# Patient Record
Sex: MALE | HISPANIC OR LATINO | URBAN - METROPOLITAN AREA
[De-identification: names, ages, dates, MRNs, and addresses within clinical notes are randomized per-mention and may not be internally consistent; named-entity substitution may affect disease eponyms.]

---

## 2020-08-15 ENCOUNTER — INPATIENT (INPATIENT)
Facility: HOSPITAL | Age: 25
LOS: 6 days | Discharge: HOME | DRG: 912 | End: 2020-08-22
Attending: ORTHOPAEDIC SURGERY | Admitting: GENERAL PRACTICE
Payer: MEDICAID

## 2020-08-15 VITALS
HEART RATE: 78 BPM | DIASTOLIC BLOOD PRESSURE: 75 MMHG | SYSTOLIC BLOOD PRESSURE: 137 MMHG | OXYGEN SATURATION: 100 % | TEMPERATURE: 100 F | RESPIRATION RATE: 18 BRPM | WEIGHT: 184.09 LBS

## 2020-08-15 DIAGNOSIS — S72.91XA UNSPECIFIED FRACTURE OF RIGHT FEMUR, INITIAL ENCOUNTER FOR CLOSED FRACTURE: ICD-10-CM

## 2020-08-15 LAB
ALBUMIN SERPL ELPH-MCNC: 3.9 G/DL — SIGNIFICANT CHANGE UP (ref 3.3–5)
ALP SERPL-CCNC: 98 U/L — SIGNIFICANT CHANGE UP (ref 40–120)
ALT FLD-CCNC: 108 U/L — HIGH (ref 12–78)
ANION GAP SERPL CALC-SCNC: 8 MMOL/L — SIGNIFICANT CHANGE UP (ref 5–17)
APTT BLD: 24.3 SEC — LOW (ref 27.5–35.5)
AST SERPL-CCNC: 117 U/L — HIGH (ref 15–37)
BASOPHILS # BLD AUTO: 0.04 K/UL — SIGNIFICANT CHANGE UP (ref 0–0.2)
BASOPHILS NFR BLD AUTO: 0.3 % — SIGNIFICANT CHANGE UP (ref 0–2)
BILIRUB SERPL-MCNC: 0.4 MG/DL — SIGNIFICANT CHANGE UP (ref 0.2–1.2)
BUN SERPL-MCNC: 21 MG/DL — SIGNIFICANT CHANGE UP (ref 7–23)
CALCIUM SERPL-MCNC: 8.2 MG/DL — LOW (ref 8.5–10.1)
CHLORIDE SERPL-SCNC: 110 MMOL/L — HIGH (ref 96–108)
CK SERPL-CCNC: 248 U/L — SIGNIFICANT CHANGE UP (ref 26–308)
CO2 SERPL-SCNC: 25 MMOL/L — SIGNIFICANT CHANGE UP (ref 22–31)
CREAT SERPL-MCNC: 1.15 MG/DL — SIGNIFICANT CHANGE UP (ref 0.5–1.3)
EOSINOPHIL # BLD AUTO: 0.05 K/UL — SIGNIFICANT CHANGE UP (ref 0–0.5)
EOSINOPHIL NFR BLD AUTO: 0.4 % — SIGNIFICANT CHANGE UP (ref 0–6)
ETHANOL SERPL-MCNC: <10 MG/DL — SIGNIFICANT CHANGE UP (ref 0–10)
GLUCOSE SERPL-MCNC: 128 MG/DL — HIGH (ref 70–99)
HCT VFR BLD CALC: 44.3 % — SIGNIFICANT CHANGE UP (ref 39–50)
HGB BLD-MCNC: 14.8 G/DL — SIGNIFICANT CHANGE UP (ref 13–17)
IMM GRANULOCYTES NFR BLD AUTO: 0.5 % — SIGNIFICANT CHANGE UP (ref 0–1.5)
INR BLD: 1.05 RATIO — SIGNIFICANT CHANGE UP (ref 0.88–1.16)
LACTATE SERPL-SCNC: 1.8 MMOL/L — SIGNIFICANT CHANGE UP (ref 0.7–2)
LIDOCAIN IGE QN: 158 U/L — SIGNIFICANT CHANGE UP (ref 73–393)
LYMPHOCYTES # BLD AUTO: 29.3 % — SIGNIFICANT CHANGE UP (ref 13–44)
LYMPHOCYTES # BLD AUTO: 3.41 K/UL — HIGH (ref 1–3.3)
MCHC RBC-ENTMCNC: 29.1 PG — SIGNIFICANT CHANGE UP (ref 27–34)
MCHC RBC-ENTMCNC: 33.4 GM/DL — SIGNIFICANT CHANGE UP (ref 32–36)
MCV RBC AUTO: 87.2 FL — SIGNIFICANT CHANGE UP (ref 80–100)
MONOCYTES # BLD AUTO: 0.84 K/UL — SIGNIFICANT CHANGE UP (ref 0–0.9)
MONOCYTES NFR BLD AUTO: 7.2 % — SIGNIFICANT CHANGE UP (ref 2–14)
NEUTROPHILS # BLD AUTO: 7.25 K/UL — SIGNIFICANT CHANGE UP (ref 1.8–7.4)
NEUTROPHILS NFR BLD AUTO: 62.3 % — SIGNIFICANT CHANGE UP (ref 43–77)
PLATELET # BLD AUTO: 191 K/UL — SIGNIFICANT CHANGE UP (ref 150–400)
POTASSIUM SERPL-MCNC: 3.3 MMOL/L — LOW (ref 3.5–5.3)
POTASSIUM SERPL-SCNC: 3.3 MMOL/L — LOW (ref 3.5–5.3)
PROT SERPL-MCNC: 7.1 GM/DL — SIGNIFICANT CHANGE UP (ref 6–8.3)
PROTHROM AB SERPL-ACNC: 12.3 SEC — SIGNIFICANT CHANGE UP (ref 10.6–13.6)
RBC # BLD: 5.08 M/UL — SIGNIFICANT CHANGE UP (ref 4.2–5.8)
RBC # FLD: 12.8 % — SIGNIFICANT CHANGE UP (ref 10.3–14.5)
SARS-COV-2 RNA SPEC QL NAA+PROBE: DETECTED
SODIUM SERPL-SCNC: 143 MMOL/L — SIGNIFICANT CHANGE UP (ref 135–145)
TROPONIN I SERPL-MCNC: <0.015 NG/ML — SIGNIFICANT CHANGE UP (ref 0.01–0.04)
WBC # BLD: 11.65 K/UL — HIGH (ref 3.8–10.5)
WBC # FLD AUTO: 11.65 K/UL — HIGH (ref 3.8–10.5)

## 2020-08-15 PROCEDURE — 73564 X-RAY EXAM KNEE 4 OR MORE: CPT | Mod: RT

## 2020-08-15 PROCEDURE — C1889: CPT

## 2020-08-15 PROCEDURE — 73502 X-RAY EXAM HIP UNI 2-3 VIEWS: CPT | Mod: RT

## 2020-08-15 PROCEDURE — 73502 X-RAY EXAM HIP UNI 2-3 VIEWS: CPT | Mod: 26,RT

## 2020-08-15 PROCEDURE — 70450 CT HEAD/BRAIN W/O DYE: CPT

## 2020-08-15 PROCEDURE — 85610 PROTHROMBIN TIME: CPT

## 2020-08-15 PROCEDURE — 73551 X-RAY EXAM OF FEMUR 1: CPT | Mod: RT

## 2020-08-15 PROCEDURE — 73090 X-RAY EXAM OF FOREARM: CPT | Mod: 26,LT

## 2020-08-15 PROCEDURE — 85027 COMPLETE CBC AUTOMATED: CPT

## 2020-08-15 PROCEDURE — 80076 HEPATIC FUNCTION PANEL: CPT

## 2020-08-15 PROCEDURE — P9016: CPT

## 2020-08-15 PROCEDURE — 84100 ASSAY OF PHOSPHORUS: CPT

## 2020-08-15 PROCEDURE — 73552 X-RAY EXAM OF FEMUR 2/>: CPT | Mod: RT

## 2020-08-15 PROCEDURE — 97162 PT EVAL MOD COMPLEX 30 MIN: CPT | Mod: GP

## 2020-08-15 PROCEDURE — 73080 X-RAY EXAM OF ELBOW: CPT | Mod: LT

## 2020-08-15 PROCEDURE — 73590 X-RAY EXAM OF LOWER LEG: CPT | Mod: RT

## 2020-08-15 PROCEDURE — 76000 FLUOROSCOPY <1 HR PHYS/QHP: CPT

## 2020-08-15 PROCEDURE — 73080 X-RAY EXAM OF ELBOW: CPT | Mod: 26,LT

## 2020-08-15 PROCEDURE — 36430 TRANSFUSION BLD/BLD COMPNT: CPT

## 2020-08-15 PROCEDURE — 71260 CT THORAX DX C+: CPT

## 2020-08-15 PROCEDURE — 72125 CT NECK SPINE W/O DYE: CPT | Mod: 26

## 2020-08-15 PROCEDURE — C1769: CPT

## 2020-08-15 PROCEDURE — 73060 X-RAY EXAM OF HUMERUS: CPT | Mod: 26,LT

## 2020-08-15 PROCEDURE — C1713: CPT

## 2020-08-15 PROCEDURE — 73564 X-RAY EXAM KNEE 4 OR MORE: CPT | Mod: 26,RT

## 2020-08-15 PROCEDURE — 97116 GAIT TRAINING THERAPY: CPT | Mod: GP

## 2020-08-15 PROCEDURE — 72125 CT NECK SPINE W/O DYE: CPT

## 2020-08-15 PROCEDURE — 73060 X-RAY EXAM OF HUMERUS: CPT | Mod: LT

## 2020-08-15 PROCEDURE — 85730 THROMBOPLASTIN TIME PARTIAL: CPT

## 2020-08-15 PROCEDURE — 73090 X-RAY EXAM OF FOREARM: CPT | Mod: LT

## 2020-08-15 PROCEDURE — 86769 SARS-COV-2 COVID-19 ANTIBODY: CPT

## 2020-08-15 PROCEDURE — 36415 COLL VENOUS BLD VENIPUNCTURE: CPT

## 2020-08-15 PROCEDURE — 81001 URINALYSIS AUTO W/SCOPE: CPT

## 2020-08-15 PROCEDURE — U0003: CPT

## 2020-08-15 PROCEDURE — 87635 SARS-COV-2 COVID-19 AMP PRB: CPT

## 2020-08-15 PROCEDURE — 73130 X-RAY EXAM OF HAND: CPT | Mod: RT

## 2020-08-15 PROCEDURE — 73130 X-RAY EXAM OF HAND: CPT | Mod: 26,RT

## 2020-08-15 PROCEDURE — 93005 ELECTROCARDIOGRAM TRACING: CPT

## 2020-08-15 PROCEDURE — 75635 CT ANGIO ABDOMINAL ARTERIES: CPT

## 2020-08-15 PROCEDURE — 80048 BASIC METABOLIC PNL TOTAL CA: CPT

## 2020-08-15 PROCEDURE — 86920 COMPATIBILITY TEST SPIN: CPT

## 2020-08-15 PROCEDURE — 97530 THERAPEUTIC ACTIVITIES: CPT | Mod: GP

## 2020-08-15 PROCEDURE — 70450 CT HEAD/BRAIN W/O DYE: CPT | Mod: 26

## 2020-08-15 PROCEDURE — 83735 ASSAY OF MAGNESIUM: CPT

## 2020-08-15 PROCEDURE — 73552 X-RAY EXAM OF FEMUR 2/>: CPT | Mod: 26,RT

## 2020-08-15 PROCEDURE — 73590 X-RAY EXAM OF LOWER LEG: CPT | Mod: 26,RT

## 2020-08-15 PROCEDURE — 71260 CT THORAX DX C+: CPT | Mod: 26

## 2020-08-15 PROCEDURE — 83605 ASSAY OF LACTIC ACID: CPT

## 2020-08-15 PROCEDURE — 75635 CT ANGIO ABDOMINAL ARTERIES: CPT | Mod: 26

## 2020-08-15 RX ORDER — SENNA PLUS 8.6 MG/1
2 TABLET ORAL AT BEDTIME
Refills: 0 | Status: DISCONTINUED | OUTPATIENT
Start: 2020-08-15 | End: 2020-08-16

## 2020-08-15 RX ORDER — BACITRACIN ZINC 500 UNIT/G
1 OINTMENT IN PACKET (EA) TOPICAL
Refills: 0 | Status: DISCONTINUED | OUTPATIENT
Start: 2020-08-15 | End: 2020-08-16

## 2020-08-15 RX ORDER — FENTANYL CITRATE 50 UG/ML
25 INJECTION INTRAVENOUS
Refills: 0 | Status: DISCONTINUED | OUTPATIENT
Start: 2020-08-15 | End: 2020-08-16

## 2020-08-15 RX ORDER — HYDROMORPHONE HYDROCHLORIDE 2 MG/ML
0.5 INJECTION INTRAMUSCULAR; INTRAVENOUS; SUBCUTANEOUS ONCE
Refills: 0 | Status: DISCONTINUED | OUTPATIENT
Start: 2020-08-15 | End: 2020-08-15

## 2020-08-15 RX ORDER — ACETAMINOPHEN 500 MG
975 TABLET ORAL EVERY 8 HOURS
Refills: 0 | Status: DISCONTINUED | OUTPATIENT
Start: 2020-08-15 | End: 2020-08-16

## 2020-08-15 RX ORDER — HYDROMORPHONE HYDROCHLORIDE 2 MG/ML
0.5 INJECTION INTRAMUSCULAR; INTRAVENOUS; SUBCUTANEOUS
Refills: 0 | Status: DISCONTINUED | OUTPATIENT
Start: 2020-08-15 | End: 2020-08-16

## 2020-08-15 RX ORDER — ENOXAPARIN SODIUM 100 MG/ML
30 INJECTION SUBCUTANEOUS EVERY 12 HOURS
Refills: 0 | Status: DISCONTINUED | OUTPATIENT
Start: 2020-08-15 | End: 2020-08-15

## 2020-08-15 RX ORDER — CEFAZOLIN SODIUM 1 G
2000 VIAL (EA) INJECTION ONCE
Refills: 0 | Status: COMPLETED | OUTPATIENT
Start: 2020-08-15 | End: 2020-08-15

## 2020-08-15 RX ORDER — OXYCODONE HYDROCHLORIDE 5 MG/1
10 TABLET ORAL ONCE
Refills: 0 | Status: DISCONTINUED | OUTPATIENT
Start: 2020-08-15 | End: 2020-08-16

## 2020-08-15 RX ORDER — SODIUM CHLORIDE 9 MG/ML
1000 INJECTION, SOLUTION INTRAVENOUS
Refills: 0 | Status: DISCONTINUED | OUTPATIENT
Start: 2020-08-15 | End: 2020-08-15

## 2020-08-15 RX ORDER — SODIUM CHLORIDE 9 MG/ML
1000 INJECTION INTRAMUSCULAR; INTRAVENOUS; SUBCUTANEOUS ONCE
Refills: 0 | Status: COMPLETED | OUTPATIENT
Start: 2020-08-15 | End: 2020-08-15

## 2020-08-15 RX ORDER — SODIUM CHLORIDE 9 MG/ML
1000 INJECTION, SOLUTION INTRAVENOUS
Refills: 0 | Status: DISCONTINUED | OUTPATIENT
Start: 2020-08-15 | End: 2020-08-16

## 2020-08-15 RX ORDER — OXYCODONE HYDROCHLORIDE 5 MG/1
5 TABLET ORAL EVERY 4 HOURS
Refills: 0 | Status: DISCONTINUED | OUTPATIENT
Start: 2020-08-15 | End: 2020-08-16

## 2020-08-15 RX ORDER — OXYCODONE HYDROCHLORIDE 5 MG/1
10 TABLET ORAL EVERY 4 HOURS
Refills: 0 | Status: DISCONTINUED | OUTPATIENT
Start: 2020-08-15 | End: 2020-08-16

## 2020-08-15 RX ORDER — FENTANYL CITRATE 50 UG/ML
50 INJECTION INTRAVENOUS ONCE
Refills: 0 | Status: DISCONTINUED | OUTPATIENT
Start: 2020-08-15 | End: 2020-08-15

## 2020-08-15 RX ORDER — ONDANSETRON 8 MG/1
4 TABLET, FILM COATED ORAL EVERY 6 HOURS
Refills: 0 | Status: DISCONTINUED | OUTPATIENT
Start: 2020-08-15 | End: 2020-08-16

## 2020-08-15 RX ADMIN — SODIUM CHLORIDE 1000 MILLILITER(S): 9 INJECTION INTRAMUSCULAR; INTRAVENOUS; SUBCUTANEOUS at 21:44

## 2020-08-15 RX ADMIN — SODIUM CHLORIDE 125 MILLILITER(S): 9 INJECTION, SOLUTION INTRAVENOUS at 23:43

## 2020-08-15 RX ADMIN — FENTANYL CITRATE 50 MICROGRAM(S): 50 INJECTION INTRAVENOUS at 20:40

## 2020-08-15 RX ADMIN — SODIUM CHLORIDE 1000 MILLILITER(S): 9 INJECTION INTRAMUSCULAR; INTRAVENOUS; SUBCUTANEOUS at 20:45

## 2020-08-15 RX ADMIN — SODIUM CHLORIDE 1000 MILLILITER(S): 9 INJECTION INTRAMUSCULAR; INTRAVENOUS; SUBCUTANEOUS at 21:45

## 2020-08-15 RX ADMIN — FENTANYL CITRATE 50 MICROGRAM(S): 50 INJECTION INTRAVENOUS at 20:45

## 2020-08-15 RX ADMIN — Medication 100 MILLIGRAM(S): at 20:44

## 2020-08-15 RX ADMIN — HYDROMORPHONE HYDROCHLORIDE 0.5 MILLIGRAM(S): 2 INJECTION INTRAMUSCULAR; INTRAVENOUS; SUBCUTANEOUS at 21:02

## 2020-08-15 RX ADMIN — HYDROMORPHONE HYDROCHLORIDE 0.5 MILLIGRAM(S): 2 INJECTION INTRAMUSCULAR; INTRAVENOUS; SUBCUTANEOUS at 20:56

## 2020-08-15 RX ADMIN — SODIUM CHLORIDE 1000 MILLILITER(S): 9 INJECTION INTRAMUSCULAR; INTRAVENOUS; SUBCUTANEOUS at 20:44

## 2020-08-15 RX ADMIN — Medication 2000 MILLIGRAM(S): at 21:14
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
## 2020-08-15 NOTE — ED PROVIDER NOTE - CLINICAL SUMMARY MEDICAL DECISION MAKING FREE TEXT BOX
Upgraded to code trauma, will waive labs for CT, concerns for femur fracture with hematoma. Upgraded to code trauma, will waive labs for CT, concerns for femur fracture with hematoma.  final dispo: Right adrenal hematoma without active extravasation. Displaced R mid femoral shaft with  No evidence for vascular injury. Anterior right knee laceration with foreign bodies in the superficial soft tissue

## 2020-08-15 NOTE — H&P ADULT - NSHPLABSRESULTS_GEN_ALL_CORE
Vital Signs Last 24 Hrs  T(C): 37.7 (15 Aug 2020 20:03), Max: 37.7 (15 Aug 2020 20:03)  T(F): 99.9 (15 Aug 2020 20:03), Max: 99.9 (15 Aug 2020 20:03)  HR: 78 (15 Aug 2020 20:03) (78 - 78)  BP: 137/75 (15 Aug 2020 20:03) (137/75 - 137/75)  BP(mean): --  RR: 18 (15 Aug 2020 20:03) (18 - 18)  SpO2: 100% (15 Aug 2020 20:03) (100% - 100%)                          14.8   11.65 )-----------( 191      ( 15 Aug 2020 20:08 )             44.3       08-15    143  |  110<H>  |  21  ----------------------------<  128<H>  3.3<L>   |  25  |  1.15    Ca    8.2<L>      15 Aug 2020 20:08    TPro  7.1  /  Alb  3.9  /  TBili  0.4  /  DBili  x   /  AST  117<H>  /  ALT  108<H>  /  AlkPhos  98  08-15      LIVER FUNCTIONS - ( 15 Aug 2020 20:08 )  Alb: 3.9 g/dL / Pro: 7.1 gm/dL / ALK PHOS: 98 U/L / ALT: 108 U/L / AST: 117 U/L / GGT: x             MEDICATIONS  (STANDING):    MEDICATIONS  (PRN):      MEDICATIONS  (STANDING):    < from: CT Cervical Spine No Cont (08.15.20 @ 20:43) >      IMPRESSION: No intracranial hemorrhage or depressed calvarial fracture.    CT cervical spine:    COMPARISON: None    TECHNIQUE: Axial CT images were acquired from base of the skull to the thoracic inlet without the use of intravenous contrast. Coronal and sagittal reconstructions are provided.    FINDINGS: Cervical lordosis is maintained. Vertebral body heights and intervertebral disc spaces are maintained. Normal spinal alignment is seen without evidence of dislocation. No acute fracture is seen. No prevertebral hematoma is seen. The regional soft tissues are grossly unremarkable.    The visualized lung apices are clear.      IMPRESSION: No acute fracture or dislocation of the cervical spine.    < end of copied text > Vital Signs Last 24 Hrs  T(C): 37.7 (15 Aug 2020 20:03), Max: 37.7 (15 Aug 2020 20:03)  T(F): 99.9 (15 Aug 2020 20:03), Max: 99.9 (15 Aug 2020 20:03)  HR: 78 (15 Aug 2020 20:03) (78 - 78)  BP: 137/75 (15 Aug 2020 20:03) (137/75 - 137/75)  BP(mean): --  RR: 18 (15 Aug 2020 20:03) (18 - 18)  SpO2: 100% (15 Aug 2020 20:03) (100% - 100%)                          14.8   11.65 )-----------( 191      ( 15 Aug 2020 20:08 )             44.3       08-15    143  |  110<H>  |  21  ----------------------------<  128<H>  3.3<L>   |  25  |  1.15    Ca    8.2<L>      15 Aug 2020 20:08    TPro  7.1  /  Alb  3.9  /  TBili  0.4  /  DBili  x   /  AST  117<H>  /  ALT  108<H>  /  AlkPhos  98  08-15      LIVER FUNCTIONS - ( 15 Aug 2020 20:08 )  Alb: 3.9 g/dL / Pro: 7.1 gm/dL / ALK PHOS: 98 U/L / ALT: 108 U/L / AST: 117 U/L / GGT: x             MEDICATIONS  (STANDING):    MEDICATIONS  (PRN):      MEDICATIONS  (STANDING):    < from: CT Cervical Spine No Cont (08.15.20 @ 20:43) >      IMPRESSION: No intracranial hemorrhage or depressed calvarial fracture.    CT cervical spine:    COMPARISON: None    TECHNIQUE: Axial CT images were acquired from base of the skull to the thoracic inlet without the use of intravenous contrast. Coronal and sagittal reconstructions are provided.    FINDINGS: Cervical lordosis is maintained. Vertebral body heights and intervertebral disc spaces are maintained. Normal spinal alignment is seen without evidence of dislocation. No acute fracture is seen. No prevertebral hematoma is seen. The regional soft tissues are grossly unremarkable.  < from: CT Angio Abd Aorta w/run-off w/ IV Cont (08.15.20 @ 21:06) >      IMPRESSION:  Right adrenal hematoma. No evidence for active extravasation.    Displaced fracture through the right proximal to mid femoral shaft with overriding of the fracture fragments. No evidence for vascular injury. Patent three-vessel runoff.    Anterior right knee laceration with foreign bodies in the superficial soft tissues.    < end of copied text >    < from: CT Angio Abd Aorta w/run-off w/ IV Cont (08.15.20 @ 21:06) >    FINDINGS:  CHEST:  LUNGS AND LARGE AIRWAYS: Patentcentral airways. 3 mm right lower lobe pulmonary nodule.  PLEURA: No pleural effusion.  VESSELS: Within normal limits.  HEART: Heart size is normal. No pericardial effusion.  MEDIASTINUM AND NOAH: No lymphadenopathy.  CHEST WALL AND LOWER NECK: Within normal limits.    < end of copied text >      The visualized lung apices are clear.      IMPRESSION: No acute fracture or dislocation of the cervical spine.    < end of copied text >

## 2020-08-15 NOTE — ED PROVIDER NOTE - CARE PLAN
Principal Discharge DX:	Femur fracture, right Principal Discharge DX:	Femur fracture, right  Secondary Diagnosis:	Laceration of knee  Secondary Diagnosis:	Adrenal hematoma, initial encounter

## 2020-08-15 NOTE — ED PROVIDER NOTE - NS_EDPROVIDERDISPOUSERTYPE_ED_A_ED
lov- 12/15    tsh- 12/15    Labs done today. Patient has ov on 1/20    Spoke with patient he will have med refilled at ov when  results are back.   Scribe Attestation (For Scribes USE Only)... Attending Attestation (For Attendings USE Only).../Scribe Attestation (For Scribes USE Only)...
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
## 2020-08-15 NOTE — H&P ADULT - NSHPREVIEWOFSYSTEMS_GEN_ALL_CORE
REVIEW OF SYSTEMS:    CONSTITUTIONAL: No weakness, fevers or chills  EYES/ENT: No visual changes;  No vertigo or throat pain   NECK: No pain or stiffness  RESPIRATORY: No cough, wheezing, hemoptysis; No shortness of breath  CARDIOVASCULAR: No chest pain or palpitations  GASTROINTESTINAL: No abdominal or epigastric pain. No nausea, vomiting, or hematemesis; No diarrhea or constipation. No melena or hematochezia.  GENITOURINARY: No dysuria, frequency or hematuria  NEUROLOGICAL: No numbness or weakness  SKIN: No itching, burning, rashes, or lesions   pain LT upper,RT lower extremity  All other review of systems is negative unless indicated above.
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
## 2020-08-15 NOTE — ED ADULT NURSE NOTE - CAS EDN DISCHARGE ASSESSMENT
Alert and oriented to person, place and time/Symptoms improved/Awake/Neuro vascular intact post splinting
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
## 2020-08-16 ENCOUNTER — TRANSCRIPTION ENCOUNTER (OUTPATIENT)
Age: 25
End: 2020-08-16

## 2020-08-16 LAB
ALBUMIN SERPL ELPH-MCNC: 3.2 G/DL — LOW (ref 3.3–5)
ALP SERPL-CCNC: 62 U/L — SIGNIFICANT CHANGE UP (ref 40–120)
ALT FLD-CCNC: 88 U/L — HIGH (ref 12–78)
ANION GAP SERPL CALC-SCNC: 6 MMOL/L — SIGNIFICANT CHANGE UP (ref 5–17)
ANION GAP SERPL CALC-SCNC: 6 MMOL/L — SIGNIFICANT CHANGE UP (ref 5–17)
APPEARANCE UR: CLEAR — SIGNIFICANT CHANGE UP
APTT BLD: 25.2 SEC — LOW (ref 27.5–35.5)
AST SERPL-CCNC: 109 U/L — HIGH (ref 15–37)
BILIRUB DIRECT SERPL-MCNC: 0.2 MG/DL — SIGNIFICANT CHANGE UP (ref 0–0.2)
BILIRUB INDIRECT FLD-MCNC: 0.5 MG/DL — SIGNIFICANT CHANGE UP (ref 0.2–1)
BILIRUB SERPL-MCNC: 0.7 MG/DL — SIGNIFICANT CHANGE UP (ref 0.2–1.2)
BILIRUB UR-MCNC: NEGATIVE — SIGNIFICANT CHANGE UP
BUN SERPL-MCNC: 16 MG/DL — SIGNIFICANT CHANGE UP (ref 7–23)
BUN SERPL-MCNC: 17 MG/DL — SIGNIFICANT CHANGE UP (ref 7–23)
CALCIUM SERPL-MCNC: 7.6 MG/DL — LOW (ref 8.5–10.1)
CALCIUM SERPL-MCNC: 7.7 MG/DL — LOW (ref 8.5–10.1)
CHLORIDE SERPL-SCNC: 109 MMOL/L — HIGH (ref 96–108)
CHLORIDE SERPL-SCNC: 110 MMOL/L — HIGH (ref 96–108)
CO2 SERPL-SCNC: 25 MMOL/L — SIGNIFICANT CHANGE UP (ref 22–31)
CO2 SERPL-SCNC: 25 MMOL/L — SIGNIFICANT CHANGE UP (ref 22–31)
COLOR SPEC: YELLOW — SIGNIFICANT CHANGE UP
CREAT SERPL-MCNC: 0.94 MG/DL — SIGNIFICANT CHANGE UP (ref 0.5–1.3)
CREAT SERPL-MCNC: 0.97 MG/DL — SIGNIFICANT CHANGE UP (ref 0.5–1.3)
DIFF PNL FLD: ABNORMAL
GLUCOSE SERPL-MCNC: 154 MG/DL — HIGH (ref 70–99)
GLUCOSE SERPL-MCNC: 159 MG/DL — HIGH (ref 70–99)
GLUCOSE UR QL: NEGATIVE MG/DL — SIGNIFICANT CHANGE UP
HCT VFR BLD CALC: 33.8 % — LOW (ref 39–50)
HCT VFR BLD CALC: 35.3 % — LOW (ref 39–50)
HGB BLD-MCNC: 11.5 G/DL — LOW (ref 13–17)
HGB BLD-MCNC: 12 G/DL — LOW (ref 13–17)
INR BLD: 1.2 RATIO — HIGH (ref 0.88–1.16)
KETONES UR-MCNC: ABNORMAL
LEUKOCYTE ESTERASE UR-ACNC: NEGATIVE — SIGNIFICANT CHANGE UP
MAGNESIUM SERPL-MCNC: 1.5 MG/DL — LOW (ref 1.6–2.6)
MCHC RBC-ENTMCNC: 29.1 PG — SIGNIFICANT CHANGE UP (ref 27–34)
MCHC RBC-ENTMCNC: 29.6 PG — SIGNIFICANT CHANGE UP (ref 27–34)
MCHC RBC-ENTMCNC: 34 GM/DL — SIGNIFICANT CHANGE UP (ref 32–36)
MCHC RBC-ENTMCNC: 34 GM/DL — SIGNIFICANT CHANGE UP (ref 32–36)
MCV RBC AUTO: 85.7 FL — SIGNIFICANT CHANGE UP (ref 80–100)
MCV RBC AUTO: 86.9 FL — SIGNIFICANT CHANGE UP (ref 80–100)
NITRITE UR-MCNC: NEGATIVE — SIGNIFICANT CHANGE UP
PH UR: 6.5 — SIGNIFICANT CHANGE UP (ref 5–8)
PHOSPHATE SERPL-MCNC: 3.8 MG/DL — SIGNIFICANT CHANGE UP (ref 2.5–4.5)
PLATELET # BLD AUTO: 127 K/UL — LOW (ref 150–400)
PLATELET # BLD AUTO: 139 K/UL — LOW (ref 150–400)
POTASSIUM SERPL-MCNC: 4 MMOL/L — SIGNIFICANT CHANGE UP (ref 3.5–5.3)
POTASSIUM SERPL-MCNC: 4.5 MMOL/L — SIGNIFICANT CHANGE UP (ref 3.5–5.3)
POTASSIUM SERPL-SCNC: 4 MMOL/L — SIGNIFICANT CHANGE UP (ref 3.5–5.3)
POTASSIUM SERPL-SCNC: 4.5 MMOL/L — SIGNIFICANT CHANGE UP (ref 3.5–5.3)
PROT SERPL-MCNC: 6 GM/DL — SIGNIFICANT CHANGE UP (ref 6–8.3)
PROT UR-MCNC: 30 MG/DL
PROTHROM AB SERPL-ACNC: 13.8 SEC — HIGH (ref 10.6–13.6)
RBC # BLD: 3.89 M/UL — LOW (ref 4.2–5.8)
RBC # BLD: 4.12 M/UL — LOW (ref 4.2–5.8)
RBC # FLD: 13 % — SIGNIFICANT CHANGE UP (ref 10.3–14.5)
RBC # FLD: 13.1 % — SIGNIFICANT CHANGE UP (ref 10.3–14.5)
SARS-COV-2 IGG SERPL QL IA: POSITIVE
SARS-COV-2 IGM SERPL IA-ACNC: 75.4 INDEX — HIGH
SODIUM SERPL-SCNC: 140 MMOL/L — SIGNIFICANT CHANGE UP (ref 135–145)
SODIUM SERPL-SCNC: 141 MMOL/L — SIGNIFICANT CHANGE UP (ref 135–145)
SP GR SPEC: 1.01 — SIGNIFICANT CHANGE UP (ref 1.01–1.02)
UROBILINOGEN FLD QL: 1 MG/DL
WBC # BLD: 6.36 K/UL — SIGNIFICANT CHANGE UP (ref 3.8–10.5)
WBC # BLD: 8.22 K/UL — SIGNIFICANT CHANGE UP (ref 3.8–10.5)
WBC # FLD AUTO: 6.36 K/UL — SIGNIFICANT CHANGE UP (ref 3.8–10.5)
WBC # FLD AUTO: 8.22 K/UL — SIGNIFICANT CHANGE UP (ref 3.8–10.5)

## 2020-08-16 PROCEDURE — 93010 ELECTROCARDIOGRAM REPORT: CPT

## 2020-08-16 PROCEDURE — 73551 X-RAY EXAM OF FEMUR 1: CPT | Mod: 26,RT

## 2020-08-16 RX ORDER — CEFAZOLIN SODIUM 1 G
2000 VIAL (EA) INJECTION EVERY 8 HOURS
Refills: 0 | Status: DISCONTINUED | OUTPATIENT
Start: 2020-08-16 | End: 2020-08-16

## 2020-08-16 RX ORDER — SODIUM CHLORIDE 9 MG/ML
1000 INJECTION, SOLUTION INTRAVENOUS
Refills: 0 | Status: DISCONTINUED | OUTPATIENT
Start: 2020-08-16 | End: 2020-08-16

## 2020-08-16 RX ORDER — ONDANSETRON 8 MG/1
4 TABLET, FILM COATED ORAL EVERY 6 HOURS
Refills: 0 | Status: DISCONTINUED | OUTPATIENT
Start: 2020-08-16 | End: 2020-08-22

## 2020-08-16 RX ORDER — HYDROMORPHONE HYDROCHLORIDE 2 MG/ML
0.5 INJECTION INTRAMUSCULAR; INTRAVENOUS; SUBCUTANEOUS
Refills: 0 | Status: DISCONTINUED | OUTPATIENT
Start: 2020-08-16 | End: 2020-08-22

## 2020-08-16 RX ORDER — ASCORBIC ACID 60 MG
500 TABLET,CHEWABLE ORAL
Refills: 0 | Status: DISCONTINUED | OUTPATIENT
Start: 2020-08-16 | End: 2020-08-22

## 2020-08-16 RX ORDER — ONDANSETRON 8 MG/1
4 TABLET, FILM COATED ORAL ONCE
Refills: 0 | Status: DISCONTINUED | OUTPATIENT
Start: 2020-08-16 | End: 2020-08-16

## 2020-08-16 RX ORDER — SODIUM CHLORIDE 9 MG/ML
1000 INJECTION, SOLUTION INTRAVENOUS
Refills: 0 | Status: DISCONTINUED | OUTPATIENT
Start: 2020-08-16 | End: 2020-08-17

## 2020-08-16 RX ORDER — ACETAMINOPHEN 500 MG
975 TABLET ORAL EVERY 8 HOURS
Refills: 0 | Status: DISCONTINUED | OUTPATIENT
Start: 2020-08-16 | End: 2020-08-22

## 2020-08-16 RX ORDER — ONDANSETRON 8 MG/1
1 TABLET, FILM COATED ORAL
Qty: 10 | Refills: 0
Start: 2020-08-16 | End: 2020-08-20

## 2020-08-16 RX ORDER — DOCUSATE SODIUM 100 MG
1 CAPSULE ORAL
Qty: 7 | Refills: 0
Start: 2020-08-16 | End: 2020-08-22

## 2020-08-16 RX ORDER — HYDROMORPHONE HYDROCHLORIDE 2 MG/ML
0.5 INJECTION INTRAMUSCULAR; INTRAVENOUS; SUBCUTANEOUS ONCE
Refills: 0 | Status: DISCONTINUED | OUTPATIENT
Start: 2020-08-16 | End: 2020-08-16

## 2020-08-16 RX ORDER — HYDROMORPHONE HYDROCHLORIDE 2 MG/ML
0.5 INJECTION INTRAMUSCULAR; INTRAVENOUS; SUBCUTANEOUS
Refills: 0 | Status: DISCONTINUED | OUTPATIENT
Start: 2020-08-16 | End: 2020-08-16

## 2020-08-16 RX ORDER — OXYCODONE HYDROCHLORIDE 5 MG/1
5 TABLET ORAL EVERY 4 HOURS
Refills: 0 | Status: DISCONTINUED | OUTPATIENT
Start: 2020-08-16 | End: 2020-08-22

## 2020-08-16 RX ORDER — DIAZEPAM 5 MG
2 TABLET ORAL EVERY 8 HOURS
Refills: 0 | Status: DISCONTINUED | OUTPATIENT
Start: 2020-08-16 | End: 2020-08-16

## 2020-08-16 RX ORDER — DIAZEPAM 5 MG
2 TABLET ORAL EVERY 8 HOURS
Refills: 0 | Status: DISCONTINUED | OUTPATIENT
Start: 2020-08-16 | End: 2020-08-22

## 2020-08-16 RX ORDER — ONDANSETRON 8 MG/1
4 TABLET, FILM COATED ORAL EVERY 6 HOURS
Refills: 0 | Status: DISCONTINUED | OUTPATIENT
Start: 2020-08-16 | End: 2020-08-16

## 2020-08-16 RX ORDER — ENOXAPARIN SODIUM 100 MG/ML
40 INJECTION SUBCUTANEOUS EVERY 24 HOURS
Refills: 0 | Status: DISCONTINUED | OUTPATIENT
Start: 2020-08-17 | End: 2020-08-22

## 2020-08-16 RX ORDER — OXYCODONE HYDROCHLORIDE 5 MG/1
10 TABLET ORAL EVERY 4 HOURS
Refills: 0 | Status: DISCONTINUED | OUTPATIENT
Start: 2020-08-16 | End: 2020-08-22

## 2020-08-16 RX ORDER — FOLIC ACID 0.8 MG
1 TABLET ORAL DAILY
Refills: 0 | Status: DISCONTINUED | OUTPATIENT
Start: 2020-08-16 | End: 2020-08-22

## 2020-08-16 RX ORDER — OXYCODONE HYDROCHLORIDE 5 MG/1
10 TABLET ORAL EVERY 4 HOURS
Refills: 0 | Status: DISCONTINUED | OUTPATIENT
Start: 2020-08-16 | End: 2020-08-16

## 2020-08-16 RX ORDER — BACITRACIN ZINC 500 UNIT/G
1 OINTMENT IN PACKET (EA) TOPICAL
Refills: 0 | Status: DISCONTINUED | OUTPATIENT
Start: 2020-08-16 | End: 2020-08-16

## 2020-08-16 RX ORDER — MAGNESIUM HYDROXIDE 400 MG/1
30 TABLET, CHEWABLE ORAL DAILY
Refills: 0 | Status: DISCONTINUED | OUTPATIENT
Start: 2020-08-16 | End: 2020-08-22

## 2020-08-16 RX ORDER — OXYCODONE HYDROCHLORIDE 5 MG/1
5 TABLET ORAL EVERY 4 HOURS
Refills: 0 | Status: DISCONTINUED | OUTPATIENT
Start: 2020-08-16 | End: 2020-08-16

## 2020-08-16 RX ORDER — CEFAZOLIN SODIUM 1 G
2000 VIAL (EA) INJECTION EVERY 8 HOURS
Refills: 0 | Status: DISCONTINUED | OUTPATIENT
Start: 2020-08-17 | End: 2020-08-17

## 2020-08-16 RX ORDER — SENNA PLUS 8.6 MG/1
2 TABLET ORAL AT BEDTIME
Refills: 0 | Status: DISCONTINUED | OUTPATIENT
Start: 2020-08-16 | End: 2020-08-16

## 2020-08-16 RX ORDER — FERROUS SULFATE 325(65) MG
325 TABLET ORAL
Refills: 0 | Status: DISCONTINUED | OUTPATIENT
Start: 2020-08-16 | End: 2020-08-22

## 2020-08-16 RX ORDER — ACETAMINOPHEN 500 MG
1000 TABLET ORAL ONCE
Refills: 0 | Status: COMPLETED | OUTPATIENT
Start: 2020-08-16 | End: 2020-08-16

## 2020-08-16 RX ORDER — ACETAMINOPHEN 500 MG
975 TABLET ORAL EVERY 8 HOURS
Refills: 0 | Status: DISCONTINUED | OUTPATIENT
Start: 2020-08-16 | End: 2020-08-16

## 2020-08-16 RX ORDER — FENTANYL CITRATE 50 UG/ML
50 INJECTION INTRAVENOUS
Refills: 0 | Status: DISCONTINUED | OUTPATIENT
Start: 2020-08-16 | End: 2020-08-16

## 2020-08-16 RX ADMIN — OXYCODONE HYDROCHLORIDE 5 MILLIGRAM(S): 5 TABLET ORAL at 21:37

## 2020-08-16 RX ADMIN — Medication 1000 MILLIGRAM(S): at 07:27

## 2020-08-16 RX ADMIN — HYDROMORPHONE HYDROCHLORIDE 0.5 MILLIGRAM(S): 2 INJECTION INTRAMUSCULAR; INTRAVENOUS; SUBCUTANEOUS at 10:03

## 2020-08-16 RX ADMIN — Medication 975 MILLIGRAM(S): at 05:49

## 2020-08-16 RX ADMIN — Medication 1 TABLET(S): at 16:02

## 2020-08-16 RX ADMIN — Medication 325 MILLIGRAM(S): at 16:02

## 2020-08-16 RX ADMIN — Medication 500 MILLIGRAM(S): at 21:36

## 2020-08-16 RX ADMIN — SODIUM CHLORIDE 75 MILLILITER(S): 9 INJECTION, SOLUTION INTRAVENOUS at 16:04

## 2020-08-16 RX ADMIN — OXYCODONE HYDROCHLORIDE 10 MILLIGRAM(S): 5 TABLET ORAL at 03:12

## 2020-08-16 RX ADMIN — Medication 400 MILLIGRAM(S): at 01:40

## 2020-08-16 RX ADMIN — OXYCODONE HYDROCHLORIDE 10 MILLIGRAM(S): 5 TABLET ORAL at 16:02

## 2020-08-16 RX ADMIN — OXYCODONE HYDROCHLORIDE 10 MILLIGRAM(S): 5 TABLET ORAL at 16:29

## 2020-08-16 RX ADMIN — OXYCODONE HYDROCHLORIDE 10 MILLIGRAM(S): 5 TABLET ORAL at 09:34

## 2020-08-16 RX ADMIN — HYDROMORPHONE HYDROCHLORIDE 0.5 MILLIGRAM(S): 2 INJECTION INTRAMUSCULAR; INTRAVENOUS; SUBCUTANEOUS at 19:06

## 2020-08-16 RX ADMIN — HYDROMORPHONE HYDROCHLORIDE 0.5 MILLIGRAM(S): 2 INJECTION INTRAMUSCULAR; INTRAVENOUS; SUBCUTANEOUS at 08:11

## 2020-08-16 RX ADMIN — SODIUM CHLORIDE 75 MILLILITER(S): 9 INJECTION, SOLUTION INTRAVENOUS at 01:39

## 2020-08-16 RX ADMIN — HYDROMORPHONE HYDROCHLORIDE 0.5 MILLIGRAM(S): 2 INJECTION INTRAMUSCULAR; INTRAVENOUS; SUBCUTANEOUS at 08:42

## 2020-08-16 RX ADMIN — OXYCODONE HYDROCHLORIDE 10 MILLIGRAM(S): 5 TABLET ORAL at 10:04

## 2020-08-16 RX ADMIN — Medication 100 MILLIGRAM(S): at 05:49

## 2020-08-16 RX ADMIN — HYDROMORPHONE HYDROCHLORIDE 0.5 MILLIGRAM(S): 2 INJECTION INTRAMUSCULAR; INTRAVENOUS; SUBCUTANEOUS at 19:20

## 2020-08-16 RX ADMIN — OXYCODONE HYDROCHLORIDE 10 MILLIGRAM(S): 5 TABLET ORAL at 03:58

## 2020-08-16 RX ADMIN — OXYCODONE HYDROCHLORIDE 5 MILLIGRAM(S): 5 TABLET ORAL at 22:07

## 2020-08-16 RX ADMIN — SODIUM CHLORIDE 75 MILLILITER(S): 9 INJECTION, SOLUTION INTRAVENOUS at 20:05

## 2020-08-16 RX ADMIN — ONDANSETRON 4 MILLIGRAM(S): 8 TABLET, FILM COATED ORAL at 19:46

## 2020-08-16 RX ADMIN — HYDROMORPHONE HYDROCHLORIDE 0.5 MILLIGRAM(S): 2 INJECTION INTRAMUSCULAR; INTRAVENOUS; SUBCUTANEOUS at 09:56

## 2020-08-16 RX ADMIN — Medication 1 MILLIGRAM(S): at 16:02
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
## 2020-08-16 NOTE — DISCHARGE NOTE PROVIDER - NSDCCPCAREPLAN_GEN_ALL_CORE_FT
PRINCIPAL DISCHARGE DIAGNOSIS  Diagnosis: Femur fracture, right  Assessment and Plan of Treatment:       SECONDARY DISCHARGE DIAGNOSES  Diagnosis: Adrenal hematoma, initial encounter  Assessment and Plan of Treatment:     Diagnosis: Laceration of knee  Assessment and Plan of Treatment:
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
## 2020-08-16 NOTE — DISCHARGE NOTE PROVIDER - CARE PROVIDER_API CALL
Ishan Mendoza  ORTHOPAEDIC SURGERY  [address] [place] [address]  Phone: [phone]  Fax: [phone]  Follow Up Time:
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
## 2020-08-16 NOTE — DISCHARGE NOTE PROVIDER - NSDCCPTREATMENT_GEN_ALL_CORE_FT
PRINCIPAL PROCEDURE  Procedure: Intramedullary nailing of femur  Findings and Treatment:       SECONDARY PROCEDURE  Procedure: Irrigation and debridement of knee  Findings and Treatment:

## 2020-08-16 NOTE — BRIEF OPERATIVE NOTE - NSICDXBRIEFPROCEDURE_GEN_ALL_CORE_FT
PROCEDURES:  Insertion, traction pin 16-Aug-2020 07:10:24  Live Cabrera  Irrigation and debridement of knee 16-Aug-2020 07:10:15  Live Cabrera
PROCEDURES:  Intramedullary nailing of femur 16-Aug-2020 13:27:28  Live Cabrera

## 2020-08-16 NOTE — DISCHARGE NOTE PROVIDER - NSDCMRMEDTOKEN_GEN_ALL_CORE_FT
Aspirin Enteric Coated 325 mg oral delayed release tablet: 1 tab(s) orally 2 times a day MDD:2  Colace 100 mg oral capsule: 1 cap(s) orally 2 times a day   oxyCODONE 5 mg oral tablet: 1 tab(s) orally every 4 hours, As Needed -for severe pain MDD:8   Protonix 40 mg oral delayed release tablet: 1 tab(s) orally once a day   Tylenol Extra Strength 500 mg oral tablet: 2 tab(s) orally 3 times a day   Zofran 4 mg oral tablet: 1 tab(s) orally 2 times a day, As Needed  -for nausea
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
## 2020-08-16 NOTE — DISCHARGE NOTE PROVIDER - NSDCFUADDINST_GEN_ALL_CORE_FT
ORIF DC Instructions:    1.	Analgesia PRN pain  2.	Weight bearing as tolerated right lower Extremity in blane brace with assistive device/rolling walker  3.	Continue DVT/PE Prophylaxis.**** See Med Rec.   4. Take Protonix while on AC.  5.	Out of Bed Daily, try not to sit around.  6.	Follow up with Orthopedic Surgeon Dr. Mendoza in 14 Days. Call Office For Appointment. Repeat x-rays in office.  7.	MD will need to Remove staples/sutures in office POD14 (8/31/20).  8.	Elevate the extremity as much as possible  9.	Keep bandage Clean and dry. Do not get it wet. ORIF DC Instructions:    1.	Analgesia PRN pain  2.	Weight bearing as tolerated right lower Extremity in blane brace with assistive device/rolling walker  3.	Continue DVT/PE Prophylaxis. See Med Rec.   4. Take Protonix while on AC.  5.	Out of Bed Daily, try not to sit around.  6.	Follow up with Orthopedic Surgeon Dr. Mendoza in 14 Days. Call Office For Appointment. Repeat x-rays in office.  7.	MD will need to Remove staples/sutures in office POD14 (8/31/20).  8.	Elevate the extremity as much as possible  9.	Keep bandage Clean and dry. Do not get it wet. Discharge Instructions for Right Knee I&D for Traumatic Arthrotomy and Right Proximal Femur IMN:    1.	Resume previous diet, regular or diabetic as appropriate  2.	Weight Bearing as Tolerated with [place] brace locked in extension and rolling walker/assistive device as needed.  3.	Continue DVT/PE Prophylaxis. EC ASA 325mg PO BID for 4weeks on discharge per AC team recommendations. See Med Rec.  4.	PT/OOB daily  5.	Follow up with Orthopedic Surgeon Dr. Mendoza in 14 Days. Call Office For Appointment.  6.	Staples/sutures to be removed by Dr. Mendoza ~Post-Op Day 14 (~8/29-30) provided wound is healed, no open areas drainage.  7.	Ice the hip as much as possible  8.	Keep Silverlon bandage on femur/knee. Change only if wet or soiled using Tegaderm/Paper tape and dry gauze. No bandage needed after staple removal.  9.	OK to shower with Silverlon bandage, otherwise sponge bathe only. Will need assistance to shower. Discharge Instructions for Right Knee I&D for Traumatic Arthrotomy and Right Proximal Femur IMN:    1.	Resume previous diet, regular or diabetic as appropriate  2.	Weight Bearing as Tolerated with [place] brace locked in extension and rolling walker/assistive device as needed.  3.	Continue DVT/PE Prophylaxis. EC ASA 325mg PO BID for 4weeks on discharge per AC team recommendations. See Med Rec.  4.	PT/OOB daily  5.	Follow up with Orthopedic Surgeon Dr. Mendoza in 14 Days. Call Office For Appointment.  6.	Wound care: Right hip and knee staples/sutures to be removed by Dr. Mendoza around Post-Op Day 14 (8/29-30) provided wound is healed, no open areas drainage.  Left elbow laceration wound management per trauma surgery. Keep left elbow laceration dressing clean, dry, and intact until seen by trauma surgery. Follow-up with trauma surgeon Dr. Verma around day 14 post laceration repair (8/29); Call office for appointment.  7.	Ice the hip as much as possible  8.	Keep Silverlon bandage on femur/knee. Change only if wet or soiled using Tegaderm/Paper tape and dry gauze. No bandage needed after staple removal.  9.	OK to shower with Silverlon bandage, otherwise sponge bathe only. Will need assistance to shower.

## 2020-08-16 NOTE — BRIEF OPERATIVE NOTE - OPERATION/FINDINGS
right traumatic arthotom I&D, tibial tractio pin placed right traumatic arthrotomy, excisional and non-excision I&D performed of knee joint, Right tibial traction pin placed

## 2020-08-16 NOTE — DISCHARGE NOTE PROVIDER - NSDCACTIVITY_GEN_ALL_CORE
Walking - Outdoors allowed/Stairs allowed/Walking - Indoors allowed Walking - Outdoors allowed/Stairs allowed/No heavy lifting/straining/Walking - Indoors allowed
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
## 2020-08-17 LAB
ANION GAP SERPL CALC-SCNC: 5 MMOL/L — SIGNIFICANT CHANGE UP (ref 5–17)
BUN SERPL-MCNC: 14 MG/DL — SIGNIFICANT CHANGE UP (ref 7–23)
CALCIUM SERPL-MCNC: 7.7 MG/DL — LOW (ref 8.5–10.1)
CHLORIDE SERPL-SCNC: 106 MMOL/L — SIGNIFICANT CHANGE UP (ref 96–108)
CO2 SERPL-SCNC: 27 MMOL/L — SIGNIFICANT CHANGE UP (ref 22–31)
CREAT SERPL-MCNC: 0.87 MG/DL — SIGNIFICANT CHANGE UP (ref 0.5–1.3)
GLUCOSE SERPL-MCNC: 109 MG/DL — HIGH (ref 70–99)
HCT VFR BLD CALC: 26.8 % — LOW (ref 39–50)
HGB BLD-MCNC: 9.4 G/DL — LOW (ref 13–17)
MCHC RBC-ENTMCNC: 30 PG — SIGNIFICANT CHANGE UP (ref 27–34)
MCHC RBC-ENTMCNC: 35.1 GM/DL — SIGNIFICANT CHANGE UP (ref 32–36)
MCV RBC AUTO: 85.6 FL — SIGNIFICANT CHANGE UP (ref 80–100)
PLATELET # BLD AUTO: 112 K/UL — LOW (ref 150–400)
POTASSIUM SERPL-MCNC: 3.8 MMOL/L — SIGNIFICANT CHANGE UP (ref 3.5–5.3)
POTASSIUM SERPL-SCNC: 3.8 MMOL/L — SIGNIFICANT CHANGE UP (ref 3.5–5.3)
RBC # BLD: 3.13 M/UL — LOW (ref 4.2–5.8)
RBC # FLD: 13.1 % — SIGNIFICANT CHANGE UP (ref 10.3–14.5)
SODIUM SERPL-SCNC: 138 MMOL/L — SIGNIFICANT CHANGE UP (ref 135–145)
WBC # BLD: 5.55 K/UL — SIGNIFICANT CHANGE UP (ref 3.8–10.5)
WBC # FLD AUTO: 5.55 K/UL — SIGNIFICANT CHANGE UP (ref 3.8–10.5)

## 2020-08-17 PROCEDURE — 99253 IP/OBS CNSLTJ NEW/EST LOW 45: CPT

## 2020-08-17 RX ORDER — CEFAZOLIN SODIUM 1 G
2000 VIAL (EA) INJECTION EVERY 8 HOURS
Refills: 0 | Status: COMPLETED | OUTPATIENT
Start: 2020-08-17 | End: 2020-08-18

## 2020-08-17 RX ADMIN — OXYCODONE HYDROCHLORIDE 5 MILLIGRAM(S): 5 TABLET ORAL at 07:29

## 2020-08-17 RX ADMIN — Medication 100 MILLIGRAM(S): at 05:49

## 2020-08-17 RX ADMIN — OXYCODONE HYDROCHLORIDE 10 MILLIGRAM(S): 5 TABLET ORAL at 09:58

## 2020-08-17 RX ADMIN — Medication 100 MILLIGRAM(S): at 13:13

## 2020-08-17 RX ADMIN — OXYCODONE HYDROCHLORIDE 10 MILLIGRAM(S): 5 TABLET ORAL at 23:22

## 2020-08-17 RX ADMIN — Medication 1 TABLET(S): at 09:49

## 2020-08-17 RX ADMIN — OXYCODONE HYDROCHLORIDE 10 MILLIGRAM(S): 5 TABLET ORAL at 15:20

## 2020-08-17 RX ADMIN — OXYCODONE HYDROCHLORIDE 5 MILLIGRAM(S): 5 TABLET ORAL at 05:49

## 2020-08-17 RX ADMIN — OXYCODONE HYDROCHLORIDE 10 MILLIGRAM(S): 5 TABLET ORAL at 10:44

## 2020-08-17 RX ADMIN — Medication 325 MILLIGRAM(S): at 17:06

## 2020-08-17 RX ADMIN — Medication 100 MILLIGRAM(S): at 22:50

## 2020-08-17 RX ADMIN — Medication 325 MILLIGRAM(S): at 13:14

## 2020-08-17 RX ADMIN — OXYCODONE HYDROCHLORIDE 10 MILLIGRAM(S): 5 TABLET ORAL at 22:52

## 2020-08-17 RX ADMIN — Medication 1 MILLIGRAM(S): at 09:49

## 2020-08-17 RX ADMIN — Medication 325 MILLIGRAM(S): at 07:58

## 2020-08-17 RX ADMIN — Medication 500 MILLIGRAM(S): at 09:49

## 2020-08-17 RX ADMIN — Medication 975 MILLIGRAM(S): at 17:33

## 2020-08-17 RX ADMIN — Medication 975 MILLIGRAM(S): at 17:07

## 2020-08-17 RX ADMIN — Medication 500 MILLIGRAM(S): at 22:49

## 2020-08-17 RX ADMIN — ENOXAPARIN SODIUM 40 MILLIGRAM(S): 100 INJECTION SUBCUTANEOUS at 05:48

## 2020-08-17 RX ADMIN — OXYCODONE HYDROCHLORIDE 10 MILLIGRAM(S): 5 TABLET ORAL at 15:41

## 2020-08-17 NOTE — PROGRESS NOTE ADULT - SUBJECTIVE AND OBJECTIVE BOX
I fit Archie's rt LE with a hinged knee brace ,locked in extension. Brace was applied over femoral length sleeve,leaving ace wrap in place. Pt was instructed on donning ,doffing and cleaning of brace.    [place] [phone]

## 2020-08-17 NOTE — CDI QUERY NOTE - NSCDIOTHERTXTBX2_GEN_ALL_CORE_FT
Clinical documentation indicates that this patient p/w  _____Hgb of 14.8, decreased to __9.4_____. Please include more specific documentation, either known or suspected, of the acuity, type and underlying cause of this condition in your Progress Note and/or Discharge Summary.  A. acute blood loss anemia present  B. other anemia (specify type) present  C. not clinically significant    Present on Admission:Was the severity of the condition present on admission?  If so, please document in the chart that “(the condition) was present on admission.”  In responding to this request, please exercise your independent professional judgment.  The fact that a question is asked does not imply that any particular answer is desired or expected. Documentation clarification is required for compliance, accuracy in coding and billing, and reporting severity of illness, quality data and risk of mortality.
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
## 2020-08-17 NOTE — PHYSICAL THERAPY INITIAL EVALUATION ADULT - GENERAL OBSERVATIONS, REHAB EVAL
Pt rec'd supine in bed, blane brace donned on RLE locked in ext, pt endorses minimal pain at rest, cooperative with PT.
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
## 2020-08-17 NOTE — PROGRESS NOTE ADULT - SUBJECTIVE AND OBJECTIVE BOX
Patient seen and examined at bedside. No acute events over night. Patient reports that he is feeeling well, pain controlled. Denies chest pain, shortness of breath, nausea or vomiting.     PE:  Vital Signs Last 24 Hrs  T(C): 36.8 (08-17-20 @ 08:03), Max: 36.8 (08-16-20 @ 23:35)  T(F): 98.3 (08-17-20 @ 08:03), Max: 98.3 (08-17-20 @ 08:03)  HR: 88 (08-17-20 @ 08:03) (60 - 89)  BP: 128/69 (08-17-20 @ 08:03) (116/57 - 136/80)  BP(mean): --  RR: 18 (08-17-20 @ 08:03) (14 - 18)  SpO2: 96% (08-17-20 @ 08:03) (96% - 100%)    General: NAD, resting comfortably in bed  RLE:   Dressing C/D/I  SCDs present bilaterally  Compartments soft and compressible  No calf tenderness bilaterally  +TA/EHL/FHL/GSC  SILT L3-S1  2+ DP/PT                          9.4    5.55  )-----------( 112      ( 17 Aug 2020 07:40 )             26.8     17 Aug 2020 07:40    138    |  106    |  14     ----------------------------<  109    3.8     |  27     |  0.87     Ca    7.7        17 Aug 2020 07:40  Phos  3.8       16 Aug 2020 07:23  Mg     1.5       16 Aug 2020 07:23    TPro  6.0    /  Alb  3.2    /  TBili  0.7    /  DBili  0.2    /  AST  109    /  ALT  88     /  AlkPhos  62     16 Aug 2020 07:23    PT/INR - ( 16 Aug 2020 07:23 )   PT: 13.8 sec;   INR: 1.20 ratio         PTT - ( 16 Aug 2020 07:23 )  PTT:25.2 sec    A/P:  24y m s/p F femur IMN POD 1  -PT/OT -WBAT RLE  -Pain Control  -DVT ppx per AC pending  -FU AM Labs  -Rest, ice, compress and elevate the extremity as we needed  -Incentive Spirometry  -COVID precautions  -Medical management appreciated  -Dispo: peding

## 2020-08-17 NOTE — CDI QUERY NOTE - NSCDIOTHERTXTBX_GEN_ALL_CORE_HH
Irrigation and Debridement performed on R knee. Note states "skin and subcutaneous tissue dissected out"   Please clarify and include the following additional detail:    1. Type of debridement (Specify excisional vs. non-excisional debridement)        Excisional:   the removal of necrotic devitalized tissue or slough by means of cutting away of tissue, slough, or necrosis.        Non-excisional:  non-operative brushing, irrigation, scrubbing, washing (minor removal of loose fragments).
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
## 2020-08-17 NOTE — PHYSICAL THERAPY INITIAL EVALUATION ADULT - RANGE OF MOTION EXAMINATION, REHAB EVAL
Left LE ROM was WFL (within functional limits)/right ankle WFL, Right hip/knee NT/bilateral upper extremity ROM was WFL (within functional limits)

## 2020-08-18 LAB
ANION GAP SERPL CALC-SCNC: 8 MMOL/L — SIGNIFICANT CHANGE UP (ref 5–17)
BUN SERPL-MCNC: 13 MG/DL — SIGNIFICANT CHANGE UP (ref 7–23)
CALCIUM SERPL-MCNC: 7.9 MG/DL — LOW (ref 8.5–10.1)
CHLORIDE SERPL-SCNC: 103 MMOL/L — SIGNIFICANT CHANGE UP (ref 96–108)
CO2 SERPL-SCNC: 26 MMOL/L — SIGNIFICANT CHANGE UP (ref 22–31)
CREAT SERPL-MCNC: 0.96 MG/DL — SIGNIFICANT CHANGE UP (ref 0.5–1.3)
GLUCOSE SERPL-MCNC: 97 MG/DL — SIGNIFICANT CHANGE UP (ref 70–99)
HCT VFR BLD CALC: 25.5 % — LOW (ref 39–50)
HGB BLD-MCNC: 8.6 G/DL — LOW (ref 13–17)
MCHC RBC-ENTMCNC: 29 PG — SIGNIFICANT CHANGE UP (ref 27–34)
MCHC RBC-ENTMCNC: 33.7 GM/DL — SIGNIFICANT CHANGE UP (ref 32–36)
MCV RBC AUTO: 85.9 FL — SIGNIFICANT CHANGE UP (ref 80–100)
PLATELET # BLD AUTO: 112 K/UL — LOW (ref 150–400)
POTASSIUM SERPL-MCNC: 3.7 MMOL/L — SIGNIFICANT CHANGE UP (ref 3.5–5.3)
POTASSIUM SERPL-SCNC: 3.7 MMOL/L — SIGNIFICANT CHANGE UP (ref 3.5–5.3)
RBC # BLD: 2.97 M/UL — LOW (ref 4.2–5.8)
RBC # FLD: 12.7 % — SIGNIFICANT CHANGE UP (ref 10.3–14.5)
SODIUM SERPL-SCNC: 137 MMOL/L — SIGNIFICANT CHANGE UP (ref 135–145)
WBC # BLD: 6.36 K/UL — SIGNIFICANT CHANGE UP (ref 3.8–10.5)
WBC # FLD AUTO: 6.36 K/UL — SIGNIFICANT CHANGE UP (ref 3.8–10.5)

## 2020-08-18 PROCEDURE — 99253 IP/OBS CNSLTJ NEW/EST LOW 45: CPT

## 2020-08-18 PROCEDURE — 99231 SBSQ HOSP IP/OBS SF/LOW 25: CPT

## 2020-08-18 RX ORDER — PANTOPRAZOLE SODIUM 20 MG/1
1 TABLET, DELAYED RELEASE ORAL
Qty: 14 | Refills: 0
Start: 2020-08-18 | End: 2020-08-31

## 2020-08-18 RX ORDER — OXYCODONE HYDROCHLORIDE 5 MG/1
1 TABLET ORAL
Qty: 42 | Refills: 0
Start: 2020-08-18 | End: 2020-08-24

## 2020-08-18 RX ORDER — DOCUSATE SODIUM 100 MG
1 CAPSULE ORAL
Qty: 60 | Refills: 0
Start: 2020-08-18 | End: 2020-09-16

## 2020-08-18 RX ADMIN — Medication 325 MILLIGRAM(S): at 19:52

## 2020-08-18 RX ADMIN — Medication 1 TABLET(S): at 08:36

## 2020-08-18 RX ADMIN — Medication 975 MILLIGRAM(S): at 15:40

## 2020-08-18 RX ADMIN — ENOXAPARIN SODIUM 40 MILLIGRAM(S): 100 INJECTION SUBCUTANEOUS at 06:33

## 2020-08-18 RX ADMIN — OXYCODONE HYDROCHLORIDE 5 MILLIGRAM(S): 5 TABLET ORAL at 04:22

## 2020-08-18 RX ADMIN — Medication 100 MILLIGRAM(S): at 06:33

## 2020-08-18 RX ADMIN — Medication 500 MILLIGRAM(S): at 21:30

## 2020-08-18 RX ADMIN — OXYCODONE HYDROCHLORIDE 10 MILLIGRAM(S): 5 TABLET ORAL at 23:34

## 2020-08-18 RX ADMIN — Medication 1 MILLIGRAM(S): at 08:36

## 2020-08-18 RX ADMIN — Medication 500 MILLIGRAM(S): at 08:36

## 2020-08-18 RX ADMIN — Medication 325 MILLIGRAM(S): at 08:36

## 2020-08-18 RX ADMIN — OXYCODONE HYDROCHLORIDE 5 MILLIGRAM(S): 5 TABLET ORAL at 04:52

## 2020-08-18 RX ADMIN — OXYCODONE HYDROCHLORIDE 5 MILLIGRAM(S): 5 TABLET ORAL at 12:28

## 2020-08-18 RX ADMIN — OXYCODONE HYDROCHLORIDE 5 MILLIGRAM(S): 5 TABLET ORAL at 13:25

## 2020-08-18 RX ADMIN — Medication 325 MILLIGRAM(S): at 12:31

## 2020-08-18 NOTE — PROGRESS NOTE ADULT - ASSESSMENT
24y m s/p F femur IMN POD 1    -PT/OT -WBAT RLE  -Pain Control  -monitor for fever	  -DVT ppx Lovenox in house  -FU AM Labs  -Rest, ice, compress and elevate the extremity as we needed  -Incentive Spirometry  -COVID precautions  -Medical management appreciated  -Dispo: pending 24y m s/p F femur IMN POD 2    -PT/OT -WBAT RLE  -Pain Control  -monitor for fever	  -DVT ppx Lovenox in house  -FU AM Labs  -Rest, ice, compress and elevate the extremity as we needed  -Incentive Spirometry  -COVID precautions  -Medical management appreciated  -Dispo: pending 24y m s/p F femur IMN POD 2    -PT/OT -WBAT RLE encourage ambulation  -Pain Control  -monitor for fever	  -DVT ppx Lovenox in house  -FU AM Labs  -Rest, ice, compress and elevate the extremity as we needed  -Incentive Spirometry  -COVID precautions  -Medical management appreciated  -Dispo: pending
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
## 2020-08-18 NOTE — PROGRESS NOTE ADULT - SUBJECTIVE AND OBJECTIVE BOX
Patient seen and examined at bedside. No acute events over night. Patient reports that he is feeeling well, pain controlled. Denies chest pain, shortness of breath, nausea or vomiting.     PE:    Vital Signs Last 24 Hrs  T(C): 37.7 (18 Aug 2020 00:27), Max: 38.4 (17 Aug 2020 16:33)  T(F): 99.9 (18 Aug 2020 00:27), Max: 101.1 (17 Aug 2020 16:33)  HR: 88 (18 Aug 2020 00:27) (88 - 100)  BP: 140/63 (18 Aug 2020 00:27) (128/69 - 140/74)  BP(mean): --  RR: 18 (18 Aug 2020 00:27) (18 - 18)  SpO2: 98% (18 Aug 2020 00:27) (92% - 98%)    General: NAD, resting comfortably in bed  RLE:   Dressing C/D/I  SCDs present bilaterally  Compartments soft and compressible  No calf tenderness bilaterally  +TA/EHL/FHL/GSC  SILT L3-S1  2+ DP/PT                              9.4    5.55  )-----------( 112      ( 17 Aug 2020 07:40 )             26.8     08-17    138  |  106  |  14  ----------------------------<  109<H>  3.8   |  27  |  0.87    Ca    7.7<L>      17 Aug 2020 07:40  Phos  3.8     08-16  Mg     1.5     08-16    TPro  6.0  /  Alb  3.2<L>  /  TBili  0.7  /  DBili  0.2  /  AST  109<H>  /  ALT  88<H>  /  AlkPhos  62  08-16    PT/INR - ( 16 Aug 2020 07:23 )   PT: 13.8 sec;   INR: 1.20 ratio         PTT - ( 16 Aug 2020 07:23 )  PTT:25.2 sec Patient seen and examined at bedside. No acute events over night. Patient reports that he is feeeling well, pain controlled. Fever yesterday afternoon Tmax 101.1. Denies chest pain, shortness of breath, nausea or vomiting.     PE:    Vital Signs Last 24 Hrs  T(C): 37.7 (18 Aug 2020 00:27), Max: 38.4 (17 Aug 2020 16:33)  T(F): 99.9 (18 Aug 2020 00:27), Max: 101.1 (17 Aug 2020 16:33)  HR: 88 (18 Aug 2020 00:27) (88 - 100)  BP: 140/63 (18 Aug 2020 00:27) (128/69 - 140/74)  BP(mean): --  RR: 18 (18 Aug 2020 00:27) (18 - 18)  SpO2: 98% (18 Aug 2020 00:27) (92% - 98%)    General: NAD, resting comfortably in bed  RLE:   Dressing C/D/I  SCDs present bilaterally  Compartments soft and compressible  No calf tenderness bilaterally  +TA/EHL/FHL/GSC  SILT L3-S1  2+ DP/PT                              9.4    5.55  )-----------( 112      ( 17 Aug 2020 07:40 )             26.8     08-17    138  |  106  |  14  ----------------------------<  109<H>  3.8   |  27  |  0.87    Ca    7.7<L>      17 Aug 2020 07:40  Phos  3.8     08-16  Mg     1.5     08-16    TPro  6.0  /  Alb  3.2<L>  /  TBili  0.7  /  DBili  0.2  /  AST  109<H>  /  ALT  88<H>  /  AlkPhos  62  08-16    PT/INR - ( 16 Aug 2020 07:23 )   PT: 13.8 sec;   INR: 1.20 ratio         PTT - ( 16 Aug 2020 07:23 )  PTT:25.2 sec Patient seen and examined at bedside. No acute events over night. Patient reports that he is feeeling well, pain controlled, has yet to walk with PT. Fever yesterday afternoon Tmax 101.1. Denies chest pain, shortness of breath, nausea or vomiting.     PE:    Vital Signs Last 24 Hrs  T(C): 37.7 (18 Aug 2020 00:27), Max: 38.4 (17 Aug 2020 16:33)  T(F): 99.9 (18 Aug 2020 00:27), Max: 101.1 (17 Aug 2020 16:33)  HR: 88 (18 Aug 2020 00:27) (88 - 100)  BP: 140/63 (18 Aug 2020 00:27) (128/69 - 140/74)  BP(mean): --  RR: 18 (18 Aug 2020 00:27) (18 - 18)  SpO2: 98% (18 Aug 2020 00:27) (92% - 98%)    General: NAD, resting comfortably in bed  RLE:   Dressing C/D/I  SCDs present bilaterally  Compartments soft and compressible  No calf tenderness bilaterally  +TA/EHL/FHL/GSC  SILT L3-S1  2+ DP/PT                              9.4    5.55  )-----------( 112      ( 17 Aug 2020 07:40 )             26.8     08-17    138  |  106  |  14  ----------------------------<  109<H>  3.8   |  27  |  0.87    Ca    7.7<L>      17 Aug 2020 07:40  Phos  3.8     08-16  Mg     1.5     08-16    TPro  6.0  /  Alb  3.2<L>  /  TBili  0.7  /  DBili  0.2  /  AST  109<H>  /  ALT  88<H>  /  AlkPhos  62  08-16    PT/INR - ( 16 Aug 2020 07:23 )   PT: 13.8 sec;   INR: 1.20 ratio         PTT - ( 16 Aug 2020 07:23 )  PTT:25.2 sec

## 2020-08-19 LAB
ANION GAP SERPL CALC-SCNC: 7 MMOL/L — SIGNIFICANT CHANGE UP (ref 5–17)
BUN SERPL-MCNC: 13 MG/DL — SIGNIFICANT CHANGE UP (ref 7–23)
CALCIUM SERPL-MCNC: 8.2 MG/DL — LOW (ref 8.5–10.1)
CHLORIDE SERPL-SCNC: 104 MMOL/L — SIGNIFICANT CHANGE UP (ref 96–108)
CO2 SERPL-SCNC: 25 MMOL/L — SIGNIFICANT CHANGE UP (ref 22–31)
CREAT SERPL-MCNC: 0.69 MG/DL — SIGNIFICANT CHANGE UP (ref 0.5–1.3)
GLUCOSE SERPL-MCNC: 93 MG/DL — SIGNIFICANT CHANGE UP (ref 70–99)
HCT VFR BLD CALC: 27.7 % — LOW (ref 39–50)
HGB BLD-MCNC: 9.6 G/DL — LOW (ref 13–17)
MCHC RBC-ENTMCNC: 29.2 PG — SIGNIFICANT CHANGE UP (ref 27–34)
MCHC RBC-ENTMCNC: 34.7 GM/DL — SIGNIFICANT CHANGE UP (ref 32–36)
MCV RBC AUTO: 84.2 FL — SIGNIFICANT CHANGE UP (ref 80–100)
PLATELET # BLD AUTO: 141 K/UL — LOW (ref 150–400)
POTASSIUM SERPL-MCNC: 3.7 MMOL/L — SIGNIFICANT CHANGE UP (ref 3.5–5.3)
POTASSIUM SERPL-SCNC: 3.7 MMOL/L — SIGNIFICANT CHANGE UP (ref 3.5–5.3)
RBC # BLD: 3.29 M/UL — LOW (ref 4.2–5.8)
RBC # FLD: 12.7 % — SIGNIFICANT CHANGE UP (ref 10.3–14.5)
SODIUM SERPL-SCNC: 136 MMOL/L — SIGNIFICANT CHANGE UP (ref 135–145)
WBC # BLD: 6.72 K/UL — SIGNIFICANT CHANGE UP (ref 3.8–10.5)
WBC # FLD AUTO: 6.72 K/UL — SIGNIFICANT CHANGE UP (ref 3.8–10.5)

## 2020-08-19 PROCEDURE — 99232 SBSQ HOSP IP/OBS MODERATE 35: CPT

## 2020-08-19 PROCEDURE — 99231 SBSQ HOSP IP/OBS SF/LOW 25: CPT

## 2020-08-19 RX ORDER — ASPIRIN/CALCIUM CARB/MAGNESIUM 324 MG
1 TABLET ORAL
Qty: 30 | Refills: 0
Start: 2020-08-19 | End: 2020-09-17

## 2020-08-19 RX ADMIN — OXYCODONE HYDROCHLORIDE 5 MILLIGRAM(S): 5 TABLET ORAL at 22:16

## 2020-08-19 RX ADMIN — Medication 975 MILLIGRAM(S): at 09:24

## 2020-08-19 RX ADMIN — ENOXAPARIN SODIUM 40 MILLIGRAM(S): 100 INJECTION SUBCUTANEOUS at 05:48

## 2020-08-19 RX ADMIN — Medication 500 MILLIGRAM(S): at 22:16

## 2020-08-19 RX ADMIN — Medication 1 TABLET(S): at 09:23

## 2020-08-19 RX ADMIN — OXYCODONE HYDROCHLORIDE 5 MILLIGRAM(S): 5 TABLET ORAL at 23:02

## 2020-08-19 RX ADMIN — Medication 325 MILLIGRAM(S): at 17:09

## 2020-08-19 RX ADMIN — OXYCODONE HYDROCHLORIDE 5 MILLIGRAM(S): 5 TABLET ORAL at 13:14

## 2020-08-19 RX ADMIN — Medication 500 MILLIGRAM(S): at 09:23

## 2020-08-19 RX ADMIN — OXYCODONE HYDROCHLORIDE 5 MILLIGRAM(S): 5 TABLET ORAL at 12:40

## 2020-08-19 RX ADMIN — OXYCODONE HYDROCHLORIDE 5 MILLIGRAM(S): 5 TABLET ORAL at 17:37

## 2020-08-19 RX ADMIN — OXYCODONE HYDROCHLORIDE 5 MILLIGRAM(S): 5 TABLET ORAL at 17:09

## 2020-08-19 RX ADMIN — Medication 325 MILLIGRAM(S): at 12:40

## 2020-08-19 RX ADMIN — Medication 975 MILLIGRAM(S): at 09:54

## 2020-08-19 RX ADMIN — OXYCODONE HYDROCHLORIDE 10 MILLIGRAM(S): 5 TABLET ORAL at 00:30

## 2020-08-19 RX ADMIN — Medication 325 MILLIGRAM(S): at 09:23

## 2020-08-19 RX ADMIN — Medication 1 MILLIGRAM(S): at 09:23
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
## 2020-08-19 NOTE — PROGRESS NOTE ADULT - SUBJECTIVE AND OBJECTIVE BOX
Patient seen and examined at bedside. No acute events over night. Patient reports that he is feeeling well, pain controlled, states he has gotten out of bed with PT yesteday. No fevers overnight. Denies chest pain, shortness of breath, nausea or vomiting.     PE:    Vital Signs Last 24 Hrs  T(C): 37.2 (19 Aug 2020 08:06), Max: 38.2 (18 Aug 2020 15:29)  T(F): 98.9 (19 Aug 2020 08:06), Max: 100.8 (18 Aug 2020 15:29)  HR: 89 (19 Aug 2020 08:06) (77 - 98)  BP: 124/73 (19 Aug 2020 08:06) (124/73 - 145/62)  BP(mean): --  RR: 19 (19 Aug 2020 08:06) (16 - 19)  SpO2: 100% (19 Aug 2020 08:06) (98% - 100%)    General: NAD, resting comfortably in bed  RLE:   Dressing C/D/I  In knee immobilizer  SCDs present bilaterally  Compartments soft and compressible  No calf tenderness bilaterally  +TA/EHL/FHL/GSC  SILT L3-S1  2+ DP/PT
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
## 2020-08-20 LAB
ANION GAP SERPL CALC-SCNC: 7 MMOL/L — SIGNIFICANT CHANGE UP (ref 5–17)
BUN SERPL-MCNC: 15 MG/DL — SIGNIFICANT CHANGE UP (ref 7–23)
CALCIUM SERPL-MCNC: 8.2 MG/DL — LOW (ref 8.5–10.1)
CHLORIDE SERPL-SCNC: 103 MMOL/L — SIGNIFICANT CHANGE UP (ref 96–108)
CO2 SERPL-SCNC: 28 MMOL/L — SIGNIFICANT CHANGE UP (ref 22–31)
CREAT SERPL-MCNC: 0.79 MG/DL — SIGNIFICANT CHANGE UP (ref 0.5–1.3)
GLUCOSE SERPL-MCNC: 94 MG/DL — SIGNIFICANT CHANGE UP (ref 70–99)
HCT VFR BLD CALC: 28.1 % — LOW (ref 39–50)
HGB BLD-MCNC: 9.7 G/DL — LOW (ref 13–17)
MCHC RBC-ENTMCNC: 29.2 PG — SIGNIFICANT CHANGE UP (ref 27–34)
MCHC RBC-ENTMCNC: 34.5 GM/DL — SIGNIFICANT CHANGE UP (ref 32–36)
MCV RBC AUTO: 84.6 FL — SIGNIFICANT CHANGE UP (ref 80–100)
PLATELET # BLD AUTO: 172 K/UL — SIGNIFICANT CHANGE UP (ref 150–400)
POTASSIUM SERPL-MCNC: 3.8 MMOL/L — SIGNIFICANT CHANGE UP (ref 3.5–5.3)
POTASSIUM SERPL-SCNC: 3.8 MMOL/L — SIGNIFICANT CHANGE UP (ref 3.5–5.3)
RBC # BLD: 3.32 M/UL — LOW (ref 4.2–5.8)
RBC # FLD: 12.8 % — SIGNIFICANT CHANGE UP (ref 10.3–14.5)
SODIUM SERPL-SCNC: 138 MMOL/L — SIGNIFICANT CHANGE UP (ref 135–145)
WBC # BLD: 6.64 K/UL — SIGNIFICANT CHANGE UP (ref 3.8–10.5)
WBC # FLD AUTO: 6.64 K/UL — SIGNIFICANT CHANGE UP (ref 3.8–10.5)

## 2020-08-20 PROCEDURE — 99232 SBSQ HOSP IP/OBS MODERATE 35: CPT

## 2020-08-20 RX ORDER — ONDANSETRON 8 MG/1
1 TABLET, FILM COATED ORAL
Qty: 10 | Refills: 0
Start: 2020-08-20 | End: 2020-08-24

## 2020-08-20 RX ORDER — ASPIRIN/CALCIUM CARB/MAGNESIUM 324 MG
1 TABLET ORAL
Qty: 60 | Refills: 0
Start: 2020-08-20 | End: 2020-09-18

## 2020-08-20 RX ORDER — ACETAMINOPHEN 500 MG
2 TABLET ORAL
Qty: 84 | Refills: 0
Start: 2020-08-20 | End: 2020-09-02

## 2020-08-20 RX ADMIN — Medication 1 TABLET(S): at 09:08

## 2020-08-20 RX ADMIN — OXYCODONE HYDROCHLORIDE 10 MILLIGRAM(S): 5 TABLET ORAL at 17:19

## 2020-08-20 RX ADMIN — OXYCODONE HYDROCHLORIDE 10 MILLIGRAM(S): 5 TABLET ORAL at 16:01

## 2020-08-20 RX ADMIN — Medication 500 MILLIGRAM(S): at 09:08

## 2020-08-20 RX ADMIN — Medication 325 MILLIGRAM(S): at 12:07

## 2020-08-20 RX ADMIN — HYDROMORPHONE HYDROCHLORIDE 0.5 MILLIGRAM(S): 2 INJECTION INTRAMUSCULAR; INTRAVENOUS; SUBCUTANEOUS at 21:12

## 2020-08-20 RX ADMIN — Medication 325 MILLIGRAM(S): at 16:02

## 2020-08-20 RX ADMIN — OXYCODONE HYDROCHLORIDE 5 MILLIGRAM(S): 5 TABLET ORAL at 13:00

## 2020-08-20 RX ADMIN — Medication 325 MILLIGRAM(S): at 09:08

## 2020-08-20 RX ADMIN — Medication 1 MILLIGRAM(S): at 09:08

## 2020-08-20 RX ADMIN — HYDROMORPHONE HYDROCHLORIDE 0.5 MILLIGRAM(S): 2 INJECTION INTRAMUSCULAR; INTRAVENOUS; SUBCUTANEOUS at 21:42

## 2020-08-20 RX ADMIN — OXYCODONE HYDROCHLORIDE 5 MILLIGRAM(S): 5 TABLET ORAL at 06:55

## 2020-08-20 RX ADMIN — ENOXAPARIN SODIUM 40 MILLIGRAM(S): 100 INJECTION SUBCUTANEOUS at 05:57

## 2020-08-20 RX ADMIN — OXYCODONE HYDROCHLORIDE 5 MILLIGRAM(S): 5 TABLET ORAL at 06:02

## 2020-08-20 RX ADMIN — OXYCODONE HYDROCHLORIDE 5 MILLIGRAM(S): 5 TABLET ORAL at 12:11

## 2020-08-20 RX ADMIN — Medication 500 MILLIGRAM(S): at 22:36

## 2020-08-20 NOTE — PROGRESS NOTE ADULT - SUBJECTIVE AND OBJECTIVE BOX
Patient seen and examined at bedside. No acute events over night. Patient reports that he is feeeling well, pain controlled, states he has been out of bed to chair for 2 hrs today. Planning to go to friends house tomorrow once has a ride. No fevers overnight. Denies chest pain, shortness of breath, nausea or vomiting.     PE:    Vital Signs Last 24 Hrs  T(C): 37.3 (20 Aug 2020 23:54), Max: 37.4 (20 Aug 2020 16:38)  T(F): 99.1 (20 Aug 2020 23:54), Max: 99.4 (20 Aug 2020 16:38)  HR: 89 (20 Aug 2020 23:54) (80 - 89)  BP: 125/60 (20 Aug 2020 23:54) (120/63 - 139/60)  BP(mean): --  RR: 19 (20 Aug 2020 23:54) (18 - 19)  SpO2: 100% (20 Aug 2020 23:54) (98% - 100%)    General: NAD, resting comfortably in bed, left elbow lac dressed CDI  RLE:   Dressing C/D/I  In knee immobilizer  SCDs present bilaterally  Compartments soft and compressible  No calf tenderness bilaterally  +TA/EHL/FHL/GSC  SILT L3-S1  2+ DP/PT
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
## 2020-08-20 NOTE — CHART NOTE - NSCHARTNOTEFT_GEN_A_CORE
Case discussed with orthopedics. Patient on Lovenox here in hospital, to go home on ECASA 325mg PO twice daily. Will sign off, ortho aware, will reconsult if necessitated.
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
## 2020-08-21 LAB — SARS-COV-2 RNA SPEC QL NAA+PROBE: SIGNIFICANT CHANGE UP

## 2020-08-21 PROCEDURE — 99232 SBSQ HOSP IP/OBS MODERATE 35: CPT

## 2020-08-21 RX ADMIN — Medication 1 TABLET(S): at 21:56

## 2020-08-21 RX ADMIN — Medication 500 MILLIGRAM(S): at 22:01

## 2020-08-21 RX ADMIN — OXYCODONE HYDROCHLORIDE 5 MILLIGRAM(S): 5 TABLET ORAL at 21:56

## 2020-08-21 RX ADMIN — Medication 1 TABLET(S): at 08:12

## 2020-08-21 RX ADMIN — Medication 325 MILLIGRAM(S): at 11:42

## 2020-08-21 RX ADMIN — OXYCODONE HYDROCHLORIDE 10 MILLIGRAM(S): 5 TABLET ORAL at 02:56

## 2020-08-21 RX ADMIN — Medication 325 MILLIGRAM(S): at 08:12

## 2020-08-21 RX ADMIN — OXYCODONE HYDROCHLORIDE 10 MILLIGRAM(S): 5 TABLET ORAL at 12:13

## 2020-08-21 RX ADMIN — ENOXAPARIN SODIUM 40 MILLIGRAM(S): 100 INJECTION SUBCUTANEOUS at 06:08

## 2020-08-21 RX ADMIN — Medication 1 MILLIGRAM(S): at 08:13

## 2020-08-21 RX ADMIN — Medication 500 MILLIGRAM(S): at 08:12

## 2020-08-21 RX ADMIN — OXYCODONE HYDROCHLORIDE 10 MILLIGRAM(S): 5 TABLET ORAL at 11:43
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
## 2020-08-21 NOTE — PROGRESS NOTE ADULT - SUBJECTIVE AND OBJECTIVE BOX
Patient seen and examined at bedside. No acute events over night. Patient reports that he is feeeling well, pain controlled, states he has gotten out of bed with PT yesteday. No fevers overnight. Denies chest pain, shortness of breath, nausea or vomiting. States his friend will pick him up tomorrow to go home.    PE:    Vital Signs Last 24 Hrs  T(C): 37.3 (20 Aug 2020 23:54), Max: 37.4 (20 Aug 2020 16:38)  T(F): 99.1 (20 Aug 2020 23:54), Max: 99.4 (20 Aug 2020 16:38)  HR: 89 (20 Aug 2020 23:54) (80 - 89)  BP: 125/60 (20 Aug 2020 23:54) (120/63 - 139/60)  BP(mean): --  RR: 19 (20 Aug 2020 23:54) (18 - 19)  SpO2: 100% (20 Aug 2020 23:54) (98% - 100%)    General: NAD, resting comfortably in bed  RLE:   Dressing C/D/I  In blane knee brace  SCDs present bilaterally  Compartments soft and compressible  No calf tenderness bilaterally  +TA/EHL/FHL/GSC  SILT L3-S1  2+ DP/PT

## 2020-08-22 ENCOUNTER — TRANSCRIPTION ENCOUNTER (OUTPATIENT)
Age: 25
End: 2020-08-22

## 2020-08-22 VITALS
DIASTOLIC BLOOD PRESSURE: 63 MMHG | OXYGEN SATURATION: 99 % | SYSTOLIC BLOOD PRESSURE: 129 MMHG | TEMPERATURE: 99 F | RESPIRATION RATE: 19 BRPM | HEART RATE: 82 BPM

## 2020-08-22 RX ADMIN — ENOXAPARIN SODIUM 40 MILLIGRAM(S): 100 INJECTION SUBCUTANEOUS at 05:42

## 2020-08-22 RX ADMIN — Medication 1 MILLIGRAM(S): at 09:01

## 2020-08-22 RX ADMIN — Medication 325 MILLIGRAM(S): at 09:01

## 2020-08-22 RX ADMIN — OXYCODONE HYDROCHLORIDE 10 MILLIGRAM(S): 5 TABLET ORAL at 02:01

## 2020-08-22 RX ADMIN — Medication 500 MILLIGRAM(S): at 09:01

## 2020-08-22 RX ADMIN — OXYCODONE HYDROCHLORIDE 5 MILLIGRAM(S): 5 TABLET ORAL at 07:01

## 2020-08-22 RX ADMIN — OXYCODONE HYDROCHLORIDE 10 MILLIGRAM(S): 5 TABLET ORAL at 07:01

## 2020-08-22 RX ADMIN — OXYCODONE HYDROCHLORIDE 5 MILLIGRAM(S): 5 TABLET ORAL at 06:50

## 2020-08-22 NOTE — PROGRESS NOTE ADULT - SUBJECTIVE AND OBJECTIVE BOX
Patient seen and examined at bedside. No acute events over night. Patient reports that he is feeeling well, pain controlled. No fevers overnight. Denies chest pain, shortness of breath, nausea or vomiting. States his friend will pick him up today, medicated for RN to prepare for DC, COVID neg.      PE:    Vital Signs Last 24 Hrs  T(C): 37.3 (21 Aug 2020 23:32), Max: 37.3 (21 Aug 2020 16:40)  T(F): 99.2 (21 Aug 2020 23:32), Max: 99.2 (21 Aug 2020 16:40)  HR: 89 (21 Aug 2020 23:32) (79 - 89)  BP: 127/69 (21 Aug 2020 23:32) (127/69 - 136/70)  BP(mean): --  RR: 19 (21 Aug 2020 23:32) (18 - 19)  SpO2: 98% (21 Aug 2020 23:32) (95% - 98%)    General: NAD, resting comfortably in bed  RLE:   Dressing C/D/I, changed yesterday   In blane knee brace  SCDs present bilaterally  Compartments soft and compressible  No calf tenderness bilaterally  +TA/EHL/FHL/GSC  SILT L3-S1  2+ DP/PT

## 2020-08-22 NOTE — DISCHARGE NOTE NURSING/CASE MANAGEMENT/SOCIAL WORK - PATIENT PORTAL LINK FT
You can access the FollowMyHealth Patient Portal offered by [facility] by registering at the following website: [email or web address]. By joining [employer]’s FollowMyHealth portal, you will also be able to view your health information using other applications (apps) compatible with our system.
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
## 2020-08-26 DIAGNOSIS — D62 ACUTE POSTHEMORRHAGIC ANEMIA: ICD-10-CM

## 2020-08-26 DIAGNOSIS — J98.11 ATELECTASIS: ICD-10-CM

## 2020-08-26 DIAGNOSIS — S72.301A UNSPECIFIED FRACTURE OF SHAFT OF RIGHT FEMUR, INITIAL ENCOUNTER FOR CLOSED FRACTURE: ICD-10-CM
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
## 2020-08-26 DIAGNOSIS — S37.812A CONTUSION OF ADRENAL GLAND, INITIAL ENCOUNTER: ICD-10-CM

## 2020-08-26 DIAGNOSIS — S83.8X9A SPRAIN OF OTHER SPECIFIED PARTS OF UNSPECIFIED KNEE, INITIAL ENCOUNTER: ICD-10-CM

## 2020-08-26 DIAGNOSIS — Z86.19 PERSONAL HISTORY OF OTHER INFECTIOUS AND PARASITIC DISEASES: ICD-10-CM
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
## 2020-08-26 DIAGNOSIS — S81.021A LACERATION WITH FOREIGN BODY, RIGHT KNEE, INITIAL ENCOUNTER: ICD-10-CM

## 2020-08-26 DIAGNOSIS — S51.012A LACERATION WITHOUT FOREIGN BODY OF LEFT ELBOW, INITIAL ENCOUNTER: ICD-10-CM

## 2020-09-25 ENCOUNTER — EMERGENCY (EMERGENCY)
Facility: HOSPITAL | Age: 25
LOS: 0 days | Discharge: HOME | End: 2020-09-25
Attending: EMERGENCY MEDICINE
Payer: MEDICAID

## 2020-09-25 VITALS
DIASTOLIC BLOOD PRESSURE: 72 MMHG | HEIGHT: 68 IN | WEIGHT: 179.9 LBS | HEART RATE: 73 BPM | OXYGEN SATURATION: 100 % | RESPIRATION RATE: 18 BRPM | TEMPERATURE: 99 F | SYSTOLIC BLOOD PRESSURE: 133 MMHG

## 2020-09-25 DIAGNOSIS — Z86.19 PERSONAL HISTORY OF OTHER INFECTIOUS AND PARASITIC DISEASES: ICD-10-CM
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
## 2020-09-25 DIAGNOSIS — S71.121S: ICD-10-CM

## 2020-09-25 LAB
ALBUMIN SERPL ELPH-MCNC: 4 G/DL — SIGNIFICANT CHANGE UP (ref 3.3–5)
ALP SERPL-CCNC: 147 U/L — HIGH (ref 40–120)
ALT FLD-CCNC: 29 U/L — SIGNIFICANT CHANGE UP (ref 12–78)
ANION GAP SERPL CALC-SCNC: 4 MMOL/L — LOW (ref 5–17)
AST SERPL-CCNC: 18 U/L — SIGNIFICANT CHANGE UP (ref 15–37)
BASOPHILS # BLD AUTO: 0.03 K/UL — SIGNIFICANT CHANGE UP (ref 0–0.2)
BASOPHILS NFR BLD AUTO: 0.5 % — SIGNIFICANT CHANGE UP (ref 0–2)
BILIRUB SERPL-MCNC: 0.3 MG/DL — SIGNIFICANT CHANGE UP (ref 0.2–1.2)
BUN SERPL-MCNC: 15 MG/DL — SIGNIFICANT CHANGE UP (ref 7–23)
CALCIUM SERPL-MCNC: 9 MG/DL — SIGNIFICANT CHANGE UP (ref 8.5–10.1)
CHLORIDE SERPL-SCNC: 108 MMOL/L — SIGNIFICANT CHANGE UP (ref 96–108)
CO2 SERPL-SCNC: 29 MMOL/L — SIGNIFICANT CHANGE UP (ref 22–31)
CREAT SERPL-MCNC: 0.93 MG/DL — SIGNIFICANT CHANGE UP (ref 0.5–1.3)
EOSINOPHIL # BLD AUTO: 0.09 K/UL — SIGNIFICANT CHANGE UP (ref 0–0.5)
EOSINOPHIL NFR BLD AUTO: 1.4 % — SIGNIFICANT CHANGE UP (ref 0–6)
ERYTHROCYTE [SEDIMENTATION RATE] IN BLOOD: 7 MM/HR — SIGNIFICANT CHANGE UP (ref 0–15)
GLUCOSE SERPL-MCNC: 92 MG/DL — SIGNIFICANT CHANGE UP (ref 70–99)
HCT VFR BLD CALC: 38.8 % — LOW (ref 39–50)
HGB BLD-MCNC: 12.4 G/DL — LOW (ref 13–17)
IMM GRANULOCYTES NFR BLD AUTO: 0.2 % — SIGNIFICANT CHANGE UP (ref 0–1.5)
LYMPHOCYTES # BLD AUTO: 1.83 K/UL — SIGNIFICANT CHANGE UP (ref 1–3.3)
LYMPHOCYTES # BLD AUTO: 29.4 % — SIGNIFICANT CHANGE UP (ref 13–44)
MCHC RBC-ENTMCNC: 28.1 PG — SIGNIFICANT CHANGE UP (ref 27–34)
MCHC RBC-ENTMCNC: 32 GM/DL — SIGNIFICANT CHANGE UP (ref 32–36)
MCV RBC AUTO: 87.8 FL — SIGNIFICANT CHANGE UP (ref 80–100)
MONOCYTES # BLD AUTO: 0.6 K/UL — SIGNIFICANT CHANGE UP (ref 0–0.9)
MONOCYTES NFR BLD AUTO: 9.6 % — SIGNIFICANT CHANGE UP (ref 2–14)
NEUTROPHILS # BLD AUTO: 3.66 K/UL — SIGNIFICANT CHANGE UP (ref 1.8–7.4)
NEUTROPHILS NFR BLD AUTO: 58.9 % — SIGNIFICANT CHANGE UP (ref 43–77)
PLATELET # BLD AUTO: 218 K/UL — SIGNIFICANT CHANGE UP (ref 150–400)
POTASSIUM SERPL-MCNC: 3.5 MMOL/L — SIGNIFICANT CHANGE UP (ref 3.5–5.3)
POTASSIUM SERPL-SCNC: 3.5 MMOL/L — SIGNIFICANT CHANGE UP (ref 3.5–5.3)
PROT SERPL-MCNC: 7.9 GM/DL — SIGNIFICANT CHANGE UP (ref 6–8.3)
RBC # BLD: 4.42 M/UL — SIGNIFICANT CHANGE UP (ref 4.2–5.8)
RBC # FLD: 13.7 % — SIGNIFICANT CHANGE UP (ref 10.3–14.5)
SODIUM SERPL-SCNC: 141 MMOL/L — SIGNIFICANT CHANGE UP (ref 135–145)
WBC # BLD: 6.22 K/UL — SIGNIFICANT CHANGE UP (ref 3.8–10.5)
WBC # FLD AUTO: 6.22 K/UL — SIGNIFICANT CHANGE UP (ref 3.8–10.5)

## 2020-09-25 PROCEDURE — 85652 RBC SED RATE AUTOMATED: CPT

## 2020-09-25 PROCEDURE — 99283 EMERGENCY DEPT VISIT LOW MDM: CPT

## 2020-09-25 PROCEDURE — 36415 COLL VENOUS BLD VENIPUNCTURE: CPT

## 2020-09-25 PROCEDURE — 73562 X-RAY EXAM OF KNEE 3: CPT | Mod: RT

## 2020-09-25 PROCEDURE — 80053 COMPREHEN METABOLIC PANEL: CPT

## 2020-09-25 PROCEDURE — 86140 C-REACTIVE PROTEIN: CPT

## 2020-09-25 PROCEDURE — 73552 X-RAY EXAM OF FEMUR 2/>: CPT | Mod: RT

## 2020-09-25 PROCEDURE — 99284 EMERGENCY DEPT VISIT MOD MDM: CPT | Mod: 25

## 2020-09-25 PROCEDURE — 73562 X-RAY EXAM OF KNEE 3: CPT | Mod: 26,RT

## 2020-09-25 PROCEDURE — 73552 X-RAY EXAM OF FEMUR 2/>: CPT | Mod: 26,RT

## 2020-09-25 PROCEDURE — 85025 COMPLETE CBC W/AUTO DIFF WBC: CPT

## 2020-09-25 RX ORDER — CEPHALEXIN 500 MG
1 CAPSULE ORAL
Qty: 20 | Refills: 0
Start: 2020-09-25 | End: 2020-09-29

## 2020-09-25 NOTE — ED STATDOCS - CARE PROVIDER_API CALL
Ishan Mendoza  ORTHOPAEDIC SURGERY  [address] [place] [address]  Phone: [phone]  Fax: [phone]  Follow Up Time:
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
## 2020-09-25 NOTE — ED STATDOCS - PATIENT PORTAL LINK FT
You can access the FollowMyHealth Patient Portal offered by [facility] by registering at the following website: [email or web address]. By joining [employer]’s FollowMyHealth portal, you will also be able to view your health information using other applications (apps) compatible with our system.

## 2020-09-25 NOTE — ED ADULT NURSE REASSESSMENT NOTE - NS ED NURSE REASSESS COMMENT FT1
patient seen and evaluated by ortho. discharged home. iv removed. written and verbal discharge, prescription, and followup instructions given to patient. patient ambulatory with walker without difficulty. discharged home from ED at 2140.

## 2020-09-25 NOTE — ED ADULT NURSE NOTE - NSIMPLEMENTINTERV_GEN_ALL_ED
Implemented All Fall Risk Interventions:  [place] to call system. Call bell, personal items and telephone within reach. Instruct patient to call for assistance. Room bathroom lighting operational. Non-slip footwear when patient is off stretcher. Physically safe environment: no spills, clutter or unnecessary equipment. Stretcher in lowest position, wheels locked, appropriate side rails in place. Provide visual cue, wrist band, yellow gown, etc. Monitor gait and stability. Monitor for mental status changes and reorient to person, place, and time. Review medications for side effects contributing to fall risk. Reinforce activity limits and safety measures with patient and family.
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
## 2020-09-25 NOTE — ED STATDOCS - NSFOLLOWUPINSTRUCTIONS_ED_ALL_ED_FT
Retiro del cierre de la herida, cuidados posteriores    Wound Closure Removal, Care After      Louise esta hoja de información sobre cómo cuidarse después de que le hayan retirado los puntos (suturas), grapas, o los adhesivos cutáneos. [place] médico también podrá darle indicaciones más específicas. Comuníquese con [place] médico si tiene problemas o preguntas.      ¿Qué puedo esperar después del procedimiento?  Después de que las suturas o las grapas se hayan retirado o los adhesivos cutáneos se hayan caído, es común tener:  •Molestias e hinchazón en la trisha.      •Leve enrojecimiento en la trisha de la herida.        Siga estas indicaciones en [place] casa:    Si tiene jaxon venda:     •Lávese las danii con agua y jabón antes de cambiar la venda (vendaje). Use desinfectante para danii si no dispone de agua y jabón.      •Cambie el vendaje jairon se lo haya indicado el médico. Si el vendaje se moja, se ensucia o tiene mal olor, cámbielo tan pronto jairon pueda.       •Si el vendaje esta adherido a la piel, aplique agua limpia tibia sobre el vendaje hasta que se afloje y se pueda retirar sin separar los bordes de la herida. Seque zoraida la trisha con jaxon toalla limpia y suave, dando golpecitos. No frote la herida, ya que puede sangrar.        Cuidado de la herida      •Mantenga la herida limpia y seca.    •Controle la herida todos los días para detectar signos de infección. Esté atento a los siguientes signos:  •Dolor, hinchazón o enrojecimiento.      •Líquido o katheryn.       •Calor.       •Pus o mal olor.        •Lávese las danii con agua y jabón, antes y después de tocarse la herida.      •Solo aplique un ungüento o crema según las indicaciones del médico. Si usa crema o ungüento, lave la trisha con agua y jabón dos veces por día para quitarlo todo. Enjuague el jabón y seque suavemente la trisha con jaxon toalla limpia.      •Si se aplicaron bandas adhesivas o goma para cerrar la piel después de retirar las suturas o las grapas, déjelas en el lugar hasta que se despeguen solas. Si los bordes de las tiras adhesivas empiezan a despegarse y enroscarse, puede recortar los que estén sueltos. No retire las tiras adhesivas por completo a menos que el médico se lo indique.      •Continúe protegiendo la herida de lesiones.       • No se toque la herida. [place] puede causar jaxon infección.      Bañarse     • No tome marlon de inmersión, no nade ni use el jacuzzi hasta que el médico lo autorice.      •Pregúntele al médico cuándo puede ducharse.    •Siga estos pasos para ducharse:  •Si tiene un vendaje, quíteselo antes de entrar en la ducha.      •En la ducha, permita que el agua jabonosa caiga sobre la herida. Evite frotar la herida.      •Cuando salga de la ducha, seque la herida dando golpecitos con jaxon toalla limpia.      •Vuelva a aplicar un vendaje sobre la herida, si es necesario.        Cuidado de la cicatriz   •Jaxon vez que la herida haya cicatrizado por completo, tome medidas para ayudar a reducir el tamaño de la cicatriz:  •Use protector solar sobre la cicatriz o cúbrala con ropa cuando se encuentre en el exterior. Las cicatrices se queman fácilmente con el sol, lo que puede empeorar la cicatrización.      •[place] suavemente la trisha de la cicatriz. [place] puede reducir el grosor de la cicatriz.        Instrucciones generales     •[place] los medicamentos de venta luciana y los recetados solamente jairon se lo haya indicado el médico.       •Concurra a todas las visitas de control jairon se lo haya indicado el médico. [place] es importante.        Comuníquese con un médico si:    •Tiene enrojecimiento, hinchazón o dolor alrededor de la herida.       •Observa líquido o katheryn que salen de la herida.       •La herida se siente caliente al tacto.       •Observa pus o percibe mal olor que salen de la herida.       •La herida se abre.      •Tiene escalofríos.        Solicite ayuda de inmediato si:    •Tiene fiebre.      •El enrojecimiento se expande desde la herida.        Resumen    •Cambie el vendaje jairon se lo haya indicado el médico. Si el vendaje se moja, se ensucia o tiene mal olor, cámbielo tan pronto jairon pueda.      •Controle la herida todos los días para detectar signos de infección.      •Lávese las danii con agua y jabón, antes y después de tocarse la herida.      Esta información no tiene jairon fin reemplazar el consejo del médico. Asegúrese de hacerle al médico cualquier pregunta que tenga.      Document Released: 03/16/2010 Document Revised: 01/02/2019 Document Reviewed: 01/02/2019    Elsevier Patient Education © 2020 [employer] Inc[employer]
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
## 2020-09-25 NOTE — ED STATDOCS - SKIN, MLM
skin normal color for race, warm, dry and intact. skin normal color for race, warm, dry and intact. +proximal to R knee, loose suture material from healed scar, no surrounding erythema

## 2020-09-26 PROBLEM — Z78.9 OTHER SPECIFIED HEALTH STATUS: Chronic | Status: ACTIVE | Noted: 2020-08-15

## 2020-09-26 LAB — CRP SERPL-MCNC: 0.18 MG/DL — SIGNIFICANT CHANGE UP (ref 0–0.4)

## 2021-06-01 NOTE — CDI QUERY NOTE - NSCDI_DOCCLARIFY_GEN_ALL_CORE_FT
Nutrition Care Plan    Nutrition Diagnosis:   Inadequate intake related to unable to meet increased nutrition needs for wound healing as evidenced by estimated nutrition needs, consuming 0-20% of meals in house so far.    Intervention:  General/healthful diet:  Regular Diet   · Encouraged balanced meal intakes TID, include protein at each    Commercial beverage:  Will send one high protein, low carb nutrition supplement BID (160 kcal, 16 gm protein each)     Monitoring and Evaluation:  Amount of food:  Goal for patient to consume >/= 75% of meals and supplements.   · Currently with poor po intakes. Will monitor.    In responding to this request, please exercise your independent professional judgment.  The fact that a question is asked does not imply that any particular answer is desired or expected.

## 2022-01-28 NOTE — ED STATDOCS - CLINICAL SUMMARY MEDICAL DECISION MAKING FREE TEXT BOX
Breath sounds clear and equal bilaterally.
Pt with retained suture, no evidence of infection, will consult orthopedics and reassess.

## 2023-01-18 NOTE — ED ADULT NURSE NOTE - NSFALLRSKASSESSTYPE_ED_ALL_ED
From: Tigist Guzmán  Sent: 1/18/2023 2:07 PM CST  To: p Psychiatry 1 Dep [facility] Msg Pool  Subject: Referral : Scheduling with Specialty Provider    This message is being sent by Cely Philip on behalf of Tigist Guzmán.    Tigist Nuñez has found someone elsewhere so you may remove her from the waiting list.   Thank you,   Cely  
Writer removed patient from the wait list.     After review of encounter, encounter may be closed. Thank you!  
Initial (On Arrival)

## 2024-02-11 NOTE — ED ADULT NURSE NOTE - CAS EDN DISCHARGE ASSESSMENT
none
Patient baseline mental status/Symptoms improved/Alert and oriented to person, place and time/Awake

## 2024-04-01 ENCOUNTER — APPOINTMENT (OUTPATIENT)
Dept: UROLOGY | Facility: HOSPITAL | Age: 29
End: 2024-04-01

## 2024-09-25 NOTE — ED ADULT TRIAGE NOTE - INTERNATIONAL TRAVEL
No [Numbness] : numbness [Tingling] : tingling [Abnormal Sensation] : an abnormal sensation [Hypersensitivity] : hypersensitivity [Negative] : Respiratory [Poor Coordination] : poor coordination [Difficulty Walking] : difficulty walking